# Patient Record
Sex: MALE | Race: WHITE | NOT HISPANIC OR LATINO | Employment: FULL TIME | URBAN - METROPOLITAN AREA
[De-identification: names, ages, dates, MRNs, and addresses within clinical notes are randomized per-mention and may not be internally consistent; named-entity substitution may affect disease eponyms.]

---

## 2017-05-01 RX ORDER — SIMVASTATIN 20 MG
TABLET ORAL
Qty: 90 TAB | Refills: 0 | Status: SHIPPED | OUTPATIENT
Start: 2017-05-01 | End: 2017-08-03 | Stop reason: SDUPTHER

## 2017-05-01 RX ORDER — LISINOPRIL 5 MG/1
TABLET ORAL
Qty: 90 TAB | Refills: 0 | Status: SHIPPED | OUTPATIENT
Start: 2017-05-01 | End: 2017-08-03 | Stop reason: SDUPTHER

## 2017-05-01 NOTE — TELEPHONE ENCOUNTER
LOV 7/16. 3 month supply refilled. Please advise pt to make appt for follow-up and additional fills.

## 2017-05-01 NOTE — TELEPHONE ENCOUNTER
Was the patient seen in the last year in this department? Yes     Does patient have an active prescription for medications requested? No     Received Request Via: Pharmacy      Pt met protocol?: Yes     BP Readings from Last 1 Encounters:   07/26/16 142/84

## 2017-06-20 ENCOUNTER — HOSPITAL ENCOUNTER (OUTPATIENT)
Dept: LAB | Facility: MEDICAL CENTER | Age: 57
End: 2017-06-20
Attending: NURSE PRACTITIONER
Payer: COMMERCIAL

## 2017-06-20 DIAGNOSIS — E11.9 DIABETES MELLITUS TYPE 2, CONTROLLED (HCC): ICD-10-CM

## 2017-06-20 DIAGNOSIS — E11.69 HYPERLIPIDEMIA ASSOCIATED WITH TYPE 2 DIABETES MELLITUS (HCC): ICD-10-CM

## 2017-06-20 DIAGNOSIS — E78.5 HYPERLIPIDEMIA ASSOCIATED WITH TYPE 2 DIABETES MELLITUS (HCC): ICD-10-CM

## 2017-06-20 LAB
25(OH)D3 SERPL-MCNC: 38 NG/ML (ref 30–100)
ALBUMIN SERPL BCP-MCNC: 4.5 G/DL (ref 3.2–4.9)
ALBUMIN/GLOB SERPL: 1.6 G/DL
ALP SERPL-CCNC: 82 U/L (ref 30–99)
ALT SERPL-CCNC: 21 U/L (ref 2–50)
ANION GAP SERPL CALC-SCNC: 8 MMOL/L (ref 0–11.9)
AST SERPL-CCNC: 16 U/L (ref 12–45)
BILIRUB SERPL-MCNC: 0.5 MG/DL (ref 0.1–1.5)
BUN SERPL-MCNC: 14 MG/DL (ref 8–22)
CALCIUM SERPL-MCNC: 9.8 MG/DL (ref 8.5–10.5)
CHLORIDE SERPL-SCNC: 104 MMOL/L (ref 96–112)
CHOLEST SERPL-MCNC: 125 MG/DL (ref 100–199)
CO2 SERPL-SCNC: 27 MMOL/L (ref 20–33)
CREAT SERPL-MCNC: 0.84 MG/DL (ref 0.5–1.4)
CREAT UR-MCNC: 18.6 MG/DL
EST. AVERAGE GLUCOSE BLD GHB EST-MCNC: 177 MG/DL
GFR SERPL CREATININE-BSD FRML MDRD: >60 ML/MIN/1.73 M 2
GLOBULIN SER CALC-MCNC: 2.8 G/DL (ref 1.9–3.5)
GLUCOSE SERPL-MCNC: 188 MG/DL (ref 65–99)
HBA1C MFR BLD: 7.8 % (ref 0–5.6)
HDLC SERPL-MCNC: 36 MG/DL
LDLC SERPL CALC-MCNC: 63 MG/DL
MICROALBUMIN UR-MCNC: <0.7 MG/DL
MICROALBUMIN/CREAT UR: NORMAL MG/G (ref 0–30)
POTASSIUM SERPL-SCNC: 4.7 MMOL/L (ref 3.6–5.5)
PROT SERPL-MCNC: 7.3 G/DL (ref 6–8.2)
SODIUM SERPL-SCNC: 139 MMOL/L (ref 135–145)
TRIGL SERPL-MCNC: 130 MG/DL (ref 0–149)

## 2017-06-20 PROCEDURE — 82043 UR ALBUMIN QUANTITATIVE: CPT

## 2017-06-20 PROCEDURE — 82306 VITAMIN D 25 HYDROXY: CPT

## 2017-06-20 PROCEDURE — 83036 HEMOGLOBIN GLYCOSYLATED A1C: CPT

## 2017-06-20 PROCEDURE — 80061 LIPID PANEL: CPT

## 2017-06-20 PROCEDURE — 82570 ASSAY OF URINE CREATININE: CPT

## 2017-06-20 PROCEDURE — 80053 COMPREHEN METABOLIC PANEL: CPT

## 2017-06-20 PROCEDURE — 36415 COLL VENOUS BLD VENIPUNCTURE: CPT

## 2017-06-23 ENCOUNTER — TELEPHONE (OUTPATIENT)
Dept: MEDICAL GROUP | Facility: PHYSICIAN GROUP | Age: 57
End: 2017-06-23

## 2017-06-23 NOTE — TELEPHONE ENCOUNTER
----- Message from Myra Oliva D.O. sent at 6/23/2017 12:51 PM PDT -----  Elevated fasting sugars and Glycohemoglobin A1C is 7.8% which is not quite at goal for diabetes. Slightly low HDL or good cholesterol. Rest of labs are within normal limits. Advise keeping appointment with Anita next week to further discuss.   (Myra Oliva,  covering for CHRISTIANNE MccannPIleanaRGIBRAN.)

## 2017-06-28 ENCOUNTER — OFFICE VISIT (OUTPATIENT)
Dept: MEDICAL GROUP | Facility: PHYSICIAN GROUP | Age: 57
End: 2017-06-28
Payer: COMMERCIAL

## 2017-06-28 VITALS
BODY MASS INDEX: 30.34 KG/M2 | HEART RATE: 72 BPM | RESPIRATION RATE: 16 BRPM | HEIGHT: 72 IN | WEIGHT: 224 LBS | SYSTOLIC BLOOD PRESSURE: 132 MMHG | TEMPERATURE: 98.5 F | DIASTOLIC BLOOD PRESSURE: 72 MMHG | OXYGEN SATURATION: 95 %

## 2017-06-28 DIAGNOSIS — E78.5 HYPERLIPIDEMIA ASSOCIATED WITH TYPE 2 DIABETES MELLITUS (HCC): ICD-10-CM

## 2017-06-28 DIAGNOSIS — Z12.11 SCREEN FOR COLON CANCER: ICD-10-CM

## 2017-06-28 DIAGNOSIS — F17.219 CIGARETTE NICOTINE DEPENDENCE WITH NICOTINE-INDUCED DISORDER: ICD-10-CM

## 2017-06-28 DIAGNOSIS — E11.69 HYPERLIPIDEMIA ASSOCIATED WITH TYPE 2 DIABETES MELLITUS (HCC): ICD-10-CM

## 2017-06-28 DIAGNOSIS — I15.2 HYPERTENSION DUE TO ENDOCRINE DISORDER: ICD-10-CM

## 2017-06-28 PROCEDURE — 99215 OFFICE O/P EST HI 40 MIN: CPT | Performed by: NURSE PRACTITIONER

## 2017-06-28 RX ORDER — METFORMIN HYDROCHLORIDE 500 MG/1
1000 TABLET, EXTENDED RELEASE ORAL 2 TIMES DAILY WITH MEALS
Qty: 120 TAB | Refills: 2 | Status: SHIPPED | OUTPATIENT
Start: 2017-06-28 | End: 2017-09-16 | Stop reason: SDUPTHER

## 2017-06-28 RX ORDER — VARENICLINE TARTRATE 1 MG/1
1 TABLET, FILM COATED ORAL 2 TIMES DAILY
Qty: 60 TAB | Refills: 2 | Status: SHIPPED | OUTPATIENT
Start: 2017-06-28 | End: 2018-05-30 | Stop reason: SDUPTHER

## 2017-06-28 ASSESSMENT — PATIENT HEALTH QUESTIONNAIRE - PHQ9: CLINICAL INTERPRETATION OF PHQ2 SCORE: 0

## 2017-06-28 NOTE — ASSESSMENT & PLAN NOTE
Diagnosed January 2015, lab results only slightly elevated. No lab results, records requested several months ago but not received.  Managed with metformin 500 mg daily with meal, unfortunately he has not had regular evaluation or been testing his glucose in the last several months. Review of labs indicates his diabetes is not well controlled with A1c of 7.8. Reports he is feeling well. Denies any chest pain, vision changes, urinary symptoms.

## 2017-06-28 NOTE — ASSESSMENT & PLAN NOTE
Managed with simvastatin 20 mg daily. Has been  taking it with morning medication and this has helped with adherence. Denies myalgias

## 2017-06-28 NOTE — ASSESSMENT & PLAN NOTE
Started smoking again Nov 2016 after not smoking for 18 months. Used Chantix with great results, no adverse effects. He was concerned about side effects and did take reduced dose.

## 2017-06-28 NOTE — ASSESSMENT & PLAN NOTE
Managed with lisinopril 5 mg daily.  He has a monitoring device at home but doesn't use it.  No symptoms low BP: light-headed, tunnel-vision, unusual fatigue.   No symptoms high BP: pounding headache, visual changes, palpitations, flushed face. No medicine adverse effects: unusual fatigue, slow heartbeat, cough.

## 2017-06-28 NOTE — PROGRESS NOTES
Subjective:     Chief Complaint   Patient presents with   • Diabetes     follow up    • Chronic Care Management        Quirino Barker is a 56 y.o. male here today for evaluation and management of:    Last OV 7.26.16    Lab Results   Component Value Date/Time    GLYCOHEMOGLOBIN 7.8* 06/20/2017 08:19 AM      Patients most recent cholesterol was reviewed:  Lab Results   Component Value Date/Time    CHOLESTEROL, 06/20/2017 08:19 AM     Lab Results   Component Value Date/Time    LDL 63 06/20/2017 08:19 AM     Lab Results   Component Value Date/Time    HDL 36* 06/20/2017 08:19 AM     Lab Results   Component Value Date/Time    TRIGLYCERIDES 130 06/20/2017 08:19 AM      Lab Results   Component Value Date/Time    SODIUM 139 06/20/2017 08:19 AM    POTASSIUM 4.7 06/20/2017 08:19 AM    CHLORIDE 104 06/20/2017 08:19 AM    CO2 27 06/20/2017 08:19 AM    GLUCOSE 188* 06/20/2017 08:19 AM    BUN 14 06/20/2017 08:19 AM    CREATININE 0.84 06/20/2017 08:19 AM         Cigarette nicotine dependence with nicotine-induced disorder  Started smoking again Nov 2016 after not smoking for 18 months. Used Chantix with great results, no adverse effects. He was concerned about side effects and did take reduced dose.       Uncontrolled type 2 diabetes mellitus without complication, without long-term current use of insulin (CMS-[facility])  Diagnosed January 2015, lab results only slightly elevated. No lab results, records requested several months ago but not received.  Managed with metformin 500 mg daily with meal, unfortunately he has not had regular evaluation or been testing his glucose in the last several months. Review of labs indicates his diabetes is not well controlled with A1c of 7.8. Reports he is feeling well. Denies any chest pain, vision changes, urinary symptoms.      Hypertension due to endocrine disorder  Managed with lisinopril 5 mg daily.  He has a monitoring device at home but doesn't use it.  No symptoms low BP: light-headed,  tunnel-vision, unusual fatigue.   No symptoms high BP: pounding headache, visual changes, palpitations, flushed face. No medicine adverse effects: unusual fatigue, slow heartbeat, cough.     Hyperlipidemia associated with type 2 diabetes mellitus (CMS-HCC)  Managed with simvastatin 20 mg daily. Has been  taking it with morning medication and this has helped with adherence. Denies myalgias      BMI 30.0-30.9,adult  Weight is still higher than after weight loss a few years ago (best around 205 for him) but not significantly changed from last year         ROS   Denies HA, chest pain, shortness of breath, abdominal pain, bladder or bowel changes, lower extremity edema.    Current medicines (including changes today)  Current Outpatient Prescriptions   Medication Sig Dispense Refill   • metformin ER (GLUCOPHAGE XR) 500 MG TABLET SR 24 HR Take 2 Tabs by mouth 2 times a day, with meals. 120 Tab 2   • varenicline (CHANTIX ETTA) 0.5 MG X 11 & 1 MG X 42 tablet 0.5 mg PO Qday for 3 days, then 0.5 mg PO bid for 4 days, then 1 mg PO BID for 11 weeks. 1 Tab 0   • varenicline (CHANTIX) 1 MG tablet Take 1 Tab by mouth 2 times a day. 60 Tab 2   • lisinopril (PRINIVIL) 5 MG Tab TAKE 1 TABLET BY MOUTH ONCE DAILY 90 Tab 0   • simvastatin (ZOCOR) 20 MG Tab TAKE 1 TABLET BY MOUTH EVERY EVENING. 90 Tab 0   • Multiple Vitamins-Minerals (CENTRUM SILVER ADULT 50+) Tab Take  by mouth.       No current facility-administered medications for this visit.       He  has a past medical history of Hypertension; Hyperlipidemia; and Diabetes (CMS-HCC).    Allergies Review of patient's allergies indicates no known allergies.    Current medications, problem list, allergies, past medical history, and tobacco use history reviewed in EPIC today.    Health maintenance reviewed and updated. Educated on the importance of health maintenance including cancer screenings and immunizations. Patient declines colonoscopy at this time.    Objective:   Blood pressure  "132/72, pulse 72, temperature 36.9 °C (98.5 °F), resp. rate 16, height 1.829 m (6' 0.01\"), weight 101.606 kg (224 lb), SpO2 95 %. Body mass index is 30.37 kg/(m^2).     Physical Exam   Constitutional: Alert, no acute distress. Pleasant and cooperative with the examination.  Skin:   Warm, dry, no rashes in visible areas.    Eyes:   Pupils equal, round. Conjunctiva and sclera clear,    Lids normal.  ENT:  Pinna normal.   Neck:   Supple, trachea midline.  Lungs:  Normal effort and respirations. Clear to auscultation bilaterally.  CV:  Regular rate and rhythm.  MS/Ext:  Steady gait, no edema.  Psych:  Eye contact is good, affect calm.    Assessment and Plan:   The following treatment plan was discussed    1. Uncontrolled type 2 diabetes mellitus without complication, without long-term current use of insulin (CMS-[facility])  Uncontrolled, increase metformin to 1000 mg bid and resume glucose testing. counseled regarding lifestyle modifications, treatment, and need for regular evaluation  - metformin ER (GLUCOPHAGE XR) 500 MG TABLET SR 24 HR; Take 2 Tabs by mouth 2 times a day, with meals.  Dispense: 120 Tab; Refill: 2  - HEMOGLOBIN A1C; Future  - COMP METABOLIC PANEL; Future    2. Hypertension due to endocrine disorder  Stable. Continue current medicines. Monitor labs regularly.        3. Hyperlipidemia associated with type 2 diabetes mellitus (CMS-[facility])  Not at goal, HDL low. Continue current medications and lifestyle modifications.    4. Cigarette nicotine dependence with nicotine-induced disorder  Counseled regarding cessation.  - varenicline (CHANTIX ETTA) 0.5 MG X 11 & 1 MG X 42 tablet; 0.5 mg PO Qday for 3 days, then 0.5 mg PO bid for 4 days, then 1 mg PO BID for 11 weeks.  Dispense: 1 Tab; Refill: 0  - varenicline (CHANTIX) 1 MG tablet; Take 1 Tab by mouth 2 times a day.  Dispense: 60 Tab; Refill: 2      5. BMI 30.0-30.9,adult    - OBESITY COUNSELING (No Charge): Patient identified as having weight management issue.  " Appropriate orders and counseling given.    6. Screen for colon cancer    - OCCULT BLOOD FECES IMMUNOASSAY; Future     Followup: Return in about 3 months (around 9/28/2017) for DM.  As scheduled, sooner if symptoms don't resolve or with any new problems.       Patient was seen for 40 minutes, more than 50% of time spent in face to face review, consultation, counseling, and arranging future evaluation and follow up of medical conditions and care.     Reviewed side effects, risks, and benefits of medications prescribed today.  Advised to take all medications as instructed and report any side effects.   The patient voices understanding and agrees.  Report any new or worsening symptoms.  Have labs or other diagnostic studies prior to follow up.  Keep all appointments for any referrals given.      Please note this dictation was created using voice recognition software. Every reasonable attempt has been made to correct obvious errors, however there may be errors of grammar and possibly content that were not discovered before finalizing the note.

## 2017-06-28 NOTE — MR AVS SNAPSHOT
"        Quirino Barker   2017 8:15 AM   Office Visit   MRN: [id number]    Department:  [facility]   Dept Phone:  [phone]    Description:  Male : 1960   Provider:  JOYCE Mccann           Reason for Visit     Diabetes follow up     Chronic Care Management           Allergies as of 2017     No Known Allergies      You were diagnosed with     Uncontrolled type 2 diabetes mellitus without complication, without long-term current use of insulin (CMS-HCC)   [0516652]       Hypertension due to endocrine disorder   [8977256]       Hyperlipidemia associated with type 2 diabetes mellitus (CMS-[facility])   [7129960]       Cigarette nicotine dependence with nicotine-induced disorder   [389359]       Screen for colon cancer   [114612]         Vital Signs     Blood Pressure Pulse Temperature Respirations Height Weight    132/72 mmHg 72 36.9 °C (98.5 °F) 16 1.829 m (6' 0.01\") 101.606 kg (224 lb)    Body Mass Index Oxygen Saturation Smoking Status             30.37 kg/m2 95% Current Every Day Smoker         Basic Information     Date Of Birth Sex Race Ethnicity Preferred Language    1960 Male White Non- English      Your appointments     Oct 11, 2017  7:55 AM   Established Patient with JOYCE Mccann   [facility] ([place])    [address] [place] [address] [address]  [facility] [address] [phone]           You will be receiving a confirmation call a few days before your appointment from our automated call confirmation system.              Problem List              ICD-10-CM Priority Class Noted - Resolved    Uncontrolled type 2 diabetes mellitus without complication, without long-term current use of insulin (CMS-HCC) E11.65   2016 - Present    Hypertension due to endocrine disorder I15.2   2016 - Present    Hyperlipidemia associated with type 2 diabetes mellitus (CMS-[facility]) E11.69, E78.5   2016 - Present    Cigarette nicotine dependence with " nicotine-induced disorder F17.219   4/25/2016 - Present    BMI 30.0-30.9,adult Z68.30   7/26/2016 - Present      Health Maintenance        Date Due Completion Dates    COLON CANCER SCREENING ANNUAL FIT 1960 ---    IMM HEP B VACCINE (1 of 3 - Primary Series) 1960 ---    DIABETES MONOFILAMENT / LE EXAM 2/10/1963 ---    RETINAL SCREENING 8/10/1980 ---    IMM DTaP/Tdap/Td Vaccine (1 - Tdap) 8/10/1981 ---    IMM PNEUMOCOCCAL 19-64 (ADULT) MEDIUM RISK SERIES (1 of 1 - PPSV23) 8/10/1981 ---    A1C SCREENING 12/20/2017 6/20/2017    FASTING LIPID PROFILE 6/20/2018 6/20/2017    URINE ACR / MICROALBUMIN 6/20/2018 6/20/2017    SERUM CREATININE 6/20/2018 6/20/2017            Current Immunizations     No immunizations on file.      Below and/or attached are the medications your provider expects you to take. Review all of your home medications and newly ordered medications with your provider and/or pharmacist. Follow medication instructions as directed by your provider and/or pharmacist. Please keep your medication list with you and share with your provider. Update the information when medications are discontinued, doses are changed, or new medications (including over-the-counter products) are added; and carry medication information at all times in the event of emergency situations     Allergies:  No Known Allergies          Medications  Valid as of: June 28, 2017 -  9:04 AM    Generic Name Brand Name Tablet Size Instructions for use    Lisinopril (Tab) PRINIVIL 5 MG TAKE 1 TABLET BY MOUTH ONCE DAILY        MetFORMIN HCl (TABLET SR 24 HR) GLUCOPHAGE  MG Take 2 Tabs by mouth 2 times a day, with meals.        Multiple Vitamins-Minerals (Tab) CENTRUM SILVER ADULT 50+  Take  by mouth.        Simvastatin (Tab) ZOCOR 20 MG TAKE 1 TABLET BY MOUTH EVERY EVENING.        Varenicline Tartrate (Misc) CHANTIX ETTA 0.5 MG X 11 & 1 MG X 42 0.5 mg PO Qday for 3 days, then 0.5 mg PO bid for 4 days, then 1 mg PO BID for 11 weeks.         Varenicline Tartrate (Tab) CHANTIX 1 MG Take 1 Tab by mouth 2 times a day.        .                 Medicines prescribed today were sent to:     [id number] [place] [facility] [place] - [address]    [address] [place] [address]    Phone: [phone] Fax: [phone]    Open 24 Hours?: No      Medication refill instructions:       If your prescription bottle indicates you have medication refills left, it is not necessary to call your provider’s office. Please contact your pharmacy and they will refill your medication.    If your prescription bottle indicates you do not have any refills left, you may request refills at any time through one of the following ways: The online Mysafeplace system (except Urgent Care), by calling your provider’s office, or by asking your pharmacy to contact your provider’s office with a refill request. Medication refills are processed only during regular business hours and may not be available until the next business day. Your provider may request additional information or to have a follow-up visit with you prior to refilling your medication.   *Please Note: Medication refills are assigned a new Rx number when refilled electronically. Your pharmacy may indicate that no refills were authorized even though a new prescription for the same medication is available at the pharmacy. Please request the medicine by name with the pharmacy before contacting your provider for a refill.        Your To Do List     Future Labs/Procedures Complete By Expires    COMP METABOLIC PANEL  As directed 6/29/2018    HEMOGLOBIN A1C  As directed 6/29/2018    OCCULT BLOOD FECES IMMUNOASSAY  As directed 6/29/2018         Mysafeplace Access Code: [email or web address]  Expires: 7/20/2017  8:12 AM    Mysafeplace  A secure, online tool to manage your health information     [employer]’s Mysafeplace® is a secure, online tool that connects you to your personalized health information from the privacy of your home -- day or night  - making it very easy for you to manage your healthcare. Once the activation process is completed, you can even access your medical information using the [employer] francia, which is available for free in the Apple Francia store or Google Play store.     [employer] provides the following levels of access (as shown below):   My Chart Features   [facility] Doctor [facility]  Specialists [facility]  Urgent  Care Non-[facility]  Primary Care  Doctor   Email your healthcare team securely and privately 24/7 X X X    Manage appointments: schedule your next appointment; view details of past/upcoming appointments X      Request prescription refills. X      View recent personal medical records, including lab and immunizations X X X X   View health record, including health history, allergies, medications X X X X   Read reports about your outpatient visits, procedures, consult and ER notes X X X X   See your discharge summary, which is a recap of your hospital and/or ER visit that includes your diagnosis, lab results, and care plan. X X       How to register for [employer]:  1. Go to  [email or web address].  2. Click on the Sign Up Now box, which takes you to the New Member Sign Up page. You will need to provide the following information:  a. Enter your [employer] Access Code exactly as it appears at the top of this page. (You will not need to use this code after you’ve completed the sign-up process. If you do not sign up before the expiration date, you must request a new code.)   b. Enter your date of birth.   c. Enter your home email address.   d. Click Submit, and follow the next screen’s instructions.  3. Create a [employer] ID. This will be your [employer] login ID and cannot be changed, so think of one that is secure and easy to remember.  4. Create a [employer] password. You can change your password at any time.  5. Enter your Password Reset Question and Answer. This can be used at a later time if you forget your password.   6. Enter your e-mail  address. This allows you to receive e-mail notifications when new information is available in [employer].  7. Click Sign Up. You can now view your health information.    For assistance activating your [employer] account, call [phone]        Quit Tobacco Information     Do you want to quit using tobacco?    Quitting tobacco decreases risks of cancer, heart and lung disease, increases life expectancy, improves sense of taste and smell, and increases spending money, among other benefits.    If you are thinking about quitting, we can help.  • [facility] Quit Tobacco Program: 259.114.5214  o Program occurs weekly for four weeks and includes pharmacist consultation on products to support quitting smoking or chewing tobacco. A provider referral is needed for pharmacist consultation.  • Tobacco Users Help Hotline: 3-800-QUIT-NOW (871-1618) or https://[place].quitlogix.org/  o Free, confidential telephone and online coaching for [place] residents. Sessions are designed on a schedule that is convenient for you. Eligible clients receive free nicotine replacement therapy.  • Nationally: www.smokefree.gov  o Information and professional assistance to support both immediate and long-term needs as you become, and remain, a non-smoker. Smokefree.gov allows you to choose the help that best fits your needs.

## 2017-06-28 NOTE — ASSESSMENT & PLAN NOTE
Weight is still higher than after weight loss a few years ago (best around 205 for him) but not significantly changed from last year

## 2017-08-04 RX ORDER — LISINOPRIL 5 MG/1
TABLET ORAL
Qty: 90 TAB | Refills: 1 | Status: SHIPPED | OUTPATIENT
Start: 2017-08-04 | End: 2018-01-14 | Stop reason: SDUPTHER

## 2017-08-04 RX ORDER — SIMVASTATIN 20 MG
TABLET ORAL
Qty: 90 TAB | Refills: 1 | Status: SHIPPED | OUTPATIENT
Start: 2017-08-04 | End: 2018-01-14 | Stop reason: SDUPTHER

## 2017-08-04 NOTE — TELEPHONE ENCOUNTER
Pt has had OV within the 12 month protocol and lipid panel is current. 6 month supply sent to pharmacy.   Lab Results   Component Value Date/Time    CHOLESTEROL, 06/20/2017 08:19 AM    LDL 63 06/20/2017 08:19 AM    HDL 36* 06/20/2017 08:19 AM    TRIGLYCERIDES 130 06/20/2017 08:19 AM       Lab Results   Component Value Date/Time    SODIUM 139 06/20/2017 08:19 AM    POTASSIUM 4.7 06/20/2017 08:19 AM    CHLORIDE 104 06/20/2017 08:19 AM    CO2 27 06/20/2017 08:19 AM    GLUCOSE 188* 06/20/2017 08:19 AM    BUN 14 06/20/2017 08:19 AM    CREATININE 0.84 06/20/2017 08:19 AM     Lab Results   Component Value Date/Time    ALKALINE PHOSPHATASE 82 06/20/2017 08:19 AM    AST(SGOT) 16 06/20/2017 08:19 AM    ALT(SGPT) 21 06/20/2017 08:19 AM    TOTAL BILIRUBIN 0.5 06/20/2017 08:19 AM

## 2017-08-04 NOTE — TELEPHONE ENCOUNTER
Was the patient seen in the last year in this department? Yes     Does patient have an active prescription for medications requested? No     Received Request Via: Pharmacy      Pt met protocol?: Yes    LAST OV 06/28/17    BP Readings from Last 1 Encounters:   06/28/17 132/72     Lab Results   Component Value Date/Time    GLYCOHEMOGLOBIN 7.8* 06/20/2017 08:19 AM        Lab Results   Component Value Date/Time    HDL 36* 06/20/2017 08:19 AM

## 2017-09-18 RX ORDER — METFORMIN HYDROCHLORIDE 500 MG/1
TABLET, EXTENDED RELEASE ORAL
Qty: 360 TAB | Refills: 1 | Status: SHIPPED | OUTPATIENT
Start: 2017-09-18 | End: 2018-03-15 | Stop reason: SDUPTHER

## 2017-09-18 NOTE — TELEPHONE ENCOUNTER
Was the patient seen in the last year in this department? Yes     Does patient have an active prescription for medications requested? No     Received Request Via: Pharmacy      Pt met protocol?: Yes pt last ov 6/17   Lab Results   Component Value Date/Time    HDL 36 (A) 06/20/2017 08:19 AM      Cholesterol,Tot   Date Value Ref Range Status   06/20/2017 125 100 - 199 mg/dL Final

## 2017-09-18 NOTE — TELEPHONE ENCOUNTER
Patient has recently been seen by PCP within the last 6 months per protocol (6-28-17). Will refill medications for 6 months.  Lab Results   Component Value Date/Time    HBA1C 7.8 (H) 06/20/2017 08:19 AM      Lab Results   Component Value Date/Time    MALBCRT see below 06/20/2017 08:19 AM    MICROALBUR <0.7 06/20/2017 08:19 AM      Lab Results   Component Value Date/Time    ALKPHOSPHAT 82 06/20/2017 08:19 AM    ASTSGOT 16 06/20/2017 08:19 AM    ALTSGPT 21 06/20/2017 08:19 AM    TBILIRUBIN 0.5 06/20/2017 08:19 AM        Rao Acosta, [place]

## 2017-10-24 ENCOUNTER — HOSPITAL ENCOUNTER (OUTPATIENT)
Facility: MEDICAL CENTER | Age: 57
End: 2017-10-24
Attending: NURSE PRACTITIONER
Payer: COMMERCIAL

## 2017-10-24 PROCEDURE — 82274 ASSAY TEST FOR BLOOD FECAL: CPT

## 2017-10-25 ENCOUNTER — HOSPITAL ENCOUNTER (OUTPATIENT)
Dept: LAB | Facility: MEDICAL CENTER | Age: 57
End: 2017-10-25
Attending: NURSE PRACTITIONER
Payer: COMMERCIAL

## 2017-10-25 LAB
ALBUMIN SERPL BCP-MCNC: 4.5 G/DL (ref 3.2–4.9)
ALBUMIN/GLOB SERPL: 1.7 G/DL
ALP SERPL-CCNC: 81 U/L (ref 30–99)
ALT SERPL-CCNC: 19 U/L (ref 2–50)
ANION GAP SERPL CALC-SCNC: 7 MMOL/L (ref 0–11.9)
AST SERPL-CCNC: 18 U/L (ref 12–45)
BILIRUB SERPL-MCNC: 0.4 MG/DL (ref 0.1–1.5)
BUN SERPL-MCNC: 17 MG/DL (ref 8–22)
CALCIUM SERPL-MCNC: 9.9 MG/DL (ref 8.5–10.5)
CHLORIDE SERPL-SCNC: 105 MMOL/L (ref 96–112)
CO2 SERPL-SCNC: 27 MMOL/L (ref 20–33)
CREAT SERPL-MCNC: 0.86 MG/DL (ref 0.5–1.4)
EST. AVERAGE GLUCOSE BLD GHB EST-MCNC: 148 MG/DL
GFR SERPL CREATININE-BSD FRML MDRD: >60 ML/MIN/1.73 M 2
GLOBULIN SER CALC-MCNC: 2.6 G/DL (ref 1.9–3.5)
GLUCOSE SERPL-MCNC: 151 MG/DL (ref 65–99)
HBA1C MFR BLD: 6.8 % (ref 0–5.6)
POTASSIUM SERPL-SCNC: 4.8 MMOL/L (ref 3.6–5.5)
PROT SERPL-MCNC: 7.1 G/DL (ref 6–8.2)
SODIUM SERPL-SCNC: 139 MMOL/L (ref 135–145)

## 2017-10-25 PROCEDURE — 36415 COLL VENOUS BLD VENIPUNCTURE: CPT

## 2017-10-25 PROCEDURE — 80053 COMPREHEN METABOLIC PANEL: CPT

## 2017-10-25 PROCEDURE — 83036 HEMOGLOBIN GLYCOSYLATED A1C: CPT

## 2017-10-26 DIAGNOSIS — Z12.11 SCREEN FOR COLON CANCER: ICD-10-CM

## 2017-10-26 LAB — HEMOCCULT STL QL IA: POSITIVE

## 2017-11-16 ENCOUNTER — PATIENT MESSAGE (OUTPATIENT)
Dept: MEDICAL GROUP | Facility: PHYSICIAN GROUP | Age: 57
End: 2017-11-16

## 2017-11-16 DIAGNOSIS — R19.5 POSITIVE FIT (FECAL IMMUNOCHEMICAL TEST): ICD-10-CM

## 2017-11-16 NOTE — TELEPHONE ENCOUNTER
----- Message from Chichi Tovar D.O. sent at 11/16/2017  9:42 AM PST -----  Please call pt to inform them that the results from recent stool test shows blood. I recommend follow up with GI for colonoscopy. Referral created.     -Dr. Hill

## 2017-12-07 ENCOUNTER — OFFICE VISIT (OUTPATIENT)
Dept: MEDICAL GROUP | Facility: PHYSICIAN GROUP | Age: 57
End: 2017-12-07
Payer: COMMERCIAL

## 2017-12-07 VITALS
HEIGHT: 72 IN | TEMPERATURE: 98.9 F | RESPIRATION RATE: 16 BRPM | WEIGHT: 217.5 LBS | DIASTOLIC BLOOD PRESSURE: 82 MMHG | BODY MASS INDEX: 29.46 KG/M2 | HEART RATE: 83 BPM | SYSTOLIC BLOOD PRESSURE: 126 MMHG | OXYGEN SATURATION: 95 %

## 2017-12-07 DIAGNOSIS — F17.219 CIGARETTE NICOTINE DEPENDENCE WITH NICOTINE-INDUCED DISORDER: ICD-10-CM

## 2017-12-07 DIAGNOSIS — E78.5 HYPERLIPIDEMIA ASSOCIATED WITH TYPE 2 DIABETES MELLITUS (HCC): ICD-10-CM

## 2017-12-07 DIAGNOSIS — E11.9 CONTROLLED TYPE 2 DIABETES MELLITUS WITHOUT COMPLICATION, WITHOUT LONG-TERM CURRENT USE OF INSULIN (HCC): ICD-10-CM

## 2017-12-07 DIAGNOSIS — R19.5 POSITIVE FIT (FECAL IMMUNOCHEMICAL TEST): ICD-10-CM

## 2017-12-07 DIAGNOSIS — E11.69 HYPERLIPIDEMIA ASSOCIATED WITH TYPE 2 DIABETES MELLITUS (HCC): ICD-10-CM

## 2017-12-07 DIAGNOSIS — I15.2 HYPERTENSION DUE TO ENDOCRINE DISORDER: ICD-10-CM

## 2017-12-07 PROCEDURE — 99214 OFFICE O/P EST MOD 30 MIN: CPT | Performed by: NURSE PRACTITIONER

## 2017-12-07 ASSESSMENT — PAIN SCALES - GENERAL: PAINLEVEL: NO PAIN

## 2017-12-07 NOTE — ASSESSMENT & PLAN NOTE
Currently smoking approximately one half pack per day. Plans to quit smoking after the first of the year and is hopeful his insurance will cover Chantix.

## 2017-12-07 NOTE — PROGRESS NOTES
Subjective:     Chief Complaint   Patient presents with   • Follow-Up     6 mos FV     Quirino Barker is a 57 y.o. male here today for evaluation and management of issues listed below.    Controlled type 2 diabetes mellitus without complication, without long-term current use of insulin (CMS-HCC)  Diagnosed January 2015, lab results only slightly elevated. No lab results, records requested several months ago but not received.  Managed with metformin xr 1000 mg bid with meal,  Review of labs indicates his diabetes was not well controlled with A1c of 7.8 but improved to 6.8 in October 2017. Continues to have loose stool, weekly diarrhea.     Hypertension due to endocrine disorder  Managed with lisinopril 5 mg daily.  No symptoms low BP: light-headed, tunnel-vision, unusual fatigue.   No symptoms high BP: pounding headache, visual changes, palpitations, flushed face. No medicine adverse effects: unusual fatigue, slow heartbeat, cough.     Hyperlipidemia associated with type 2 diabetes mellitus (CMS-HCC)  Managed with simvastatin 20 mg daily. Has been  taking it with morning medication and this has helped with adherence. Denies myalgias    Cigarette nicotine dependence with nicotine-induced disorder  Currently smoking approximately one half pack per day. Plans to quit smoking after the first of the year and is hopeful his insurance will cover Chantix.     1. Controlled type 2 diabetes mellitus without complication, without long-term current use of insulin (CMS-HCC)    2. Hypertension due to endocrine disorder    3. Hyperlipidemia associated with type 2 diabetes mellitus (CMS-HCC)    4. Cigarette nicotine dependence with nicotine-induced disorder    5. Positive FIT (fecal immunochemical test)        ROS   Denies HA, chest pain, shortness of breath, abdominal pain, bladder or bowel changes, lower extremity edema. All other pertinent systems reviewed and are negative except as in HPI.    Allergies: Patient has no known  allergies.  Current medicines (including changes today)  Current Outpatient Prescriptions   Medication Sig Dispense Refill   • metformin ER (GLUCOPHAGE XR) 500 MG TABLET SR 24 HR TAKE 2 TABLETS BY MOUTH 2 TIMES A DAY, WITH MEALS. 360 Tab 1   • simvastatin (ZOCOR) 20 MG Tab TAKE 1 TABLET BY MOUTH EVERY EVENING. 90 Tab 1   • lisinopril (PRINIVIL) 5 MG Tab TAKE 1 TABLET BY MOUTH ONCE DAILY 90 Tab 1   • Multiple Vitamins-Minerals (CENTRUM SILVER ADULT 50+) Tab Take  by mouth.     • varenicline (CHANTIX ETTA) 0.5 MG X 11 & 1 MG X 42 tablet 0.5 mg PO Qday for 3 days, then 0.5 mg PO bid for 4 days, then 1 mg PO BID for 11 weeks. 1 Tab 0   • varenicline (CHANTIX) 1 MG tablet Take 1 Tab by mouth 2 times a day. 60 Tab 2     No current facility-administered medications for this visit.        He  has a past medical history of Diabetes (CMS-HCC); Hyperlipidemia; and Hypertension.      Health Maintenance: Reviewed and updated.      Objective:   Blood pressure 126/82, pulse 83, temperature 37.2 °C (98.9 °F), resp. rate 16, height 1.829 m (6'), weight 98.7 kg (217 lb 8 oz), SpO2 95 %. Body mass index is 29.5 kg/m².  Physical Exam   Alert, no acute distress. Pleasant and cooperative with the examination.  Eye contact is good, affect calm.  Skin: Warm, dry, no rashes in visible areas.    Eyes: Pupils equal, round. Conjunctiva and sclera clear, lids normal.  ENT: Pinna normal.  Neck: Supple, trachea midline.  Lungs: Normal effort and respirations. Clear to auscultation bilaterally.   Abdomen: No CVAT   CV: Regular rate and rhythm.  MS/Ext: Steady gait, no edema.    Results reviewed  Lab results 10.2017 compared to previous     Assessment and Plan:   Treatment:   Quirino was seen today for follow-up.    Diagnoses and all orders for this visit:    Controlled type 2 diabetes mellitus without complication, without long-term current use of insulin (CMS-HCC)  Comments:  Improved. He'll continue extended-release metformin 1000 mg twice a day.  We'll continue to monitor. A1c at next office visit in 3 months.    Hypertension due to endocrine disorder  Comments:  Stable. Continue current medications. Will continue to monitor.    Hyperlipidemia associated with type 2 diabetes mellitus (CMS-HCC)  Comments:  Continue current medications. Will continue to monitor.    Cigarette nicotine dependence with nicotine-induced disorder  Comments:  Counseled regarding smoking cessation.    Positive FIT (fecal immunochemical test)  Comments:  Advised continued follow-up with gastroenterology and colonoscopy as scheduled.        Followup: Return in about 3 months (around 3/7/2018) for DM. Sooner should new symptoms or problems arise, or as previously scheduled.           Reviewed side effects, risks, and benefits of medications prescribed today.  Advised to take all medications as instructed and report any side effects.   The patient voices understanding and agrees.  Report any new or worsening symptoms.  Have labs or other diagnostic studies prior to follow up.  Keep all appointments for any referrals given.        Please note this dictation was created using voice recognition software. Every reasonable attempt has been made to correct obvious errors, however there may be errors of grammar and possibly content that were not discovered before finalizing the note.

## 2017-12-07 NOTE — ASSESSMENT & PLAN NOTE
Managed with lisinopril 5 mg daily.  No symptoms low BP: light-headed, tunnel-vision, unusual fatigue.   No symptoms high BP: pounding headache, visual changes, palpitations, flushed face. No medicine adverse effects: unusual fatigue, slow heartbeat, cough.

## 2017-12-07 NOTE — ASSESSMENT & PLAN NOTE
Diagnosed January 2015, lab results only slightly elevated. No lab results, records requested several months ago but not received.  Managed with metformin xr 1000 mg bid with meal,  Review of labs indicates his diabetes was not well controlled with A1c of 7.8 but improved to 6.8 in October 2017. Continues to have loose stool, weekly diarrhea.

## 2018-01-15 RX ORDER — SIMVASTATIN 20 MG
TABLET ORAL
Qty: 90 TAB | Refills: 0 | Status: SHIPPED | OUTPATIENT
Start: 2018-01-15 | End: 2018-04-13 | Stop reason: SDUPTHER

## 2018-01-15 RX ORDER — LISINOPRIL 5 MG/1
TABLET ORAL
Qty: 90 TAB | Refills: 0 | Status: SHIPPED | OUTPATIENT
Start: 2018-01-15 | End: 2018-04-13 | Stop reason: SDUPTHER

## 2018-01-15 NOTE — TELEPHONE ENCOUNTER
Was the patient seen in the last year in this department? Yes     Does patient have an active prescription for medications requested? No     Received Request Via: Pharmacy      Pt met protocol?: Yes    OV 10/17     BP Readings from Last 1 Encounters:   12/07/17 126/82       Lab Results  Component Value Date/Time   CHOLSTRLTOT 125 06/20/2017 0819   TRIGLYCERIDE 130 06/20/2017 0819   HDL 36 (A) 06/20/2017 0819   LDL 63 06/20/2017 0819

## 2018-02-16 ENCOUNTER — TELEPHONE (OUTPATIENT)
Dept: MEDICAL GROUP | Facility: PHYSICIAN GROUP | Age: 58
End: 2018-02-16

## 2018-02-16 DIAGNOSIS — F17.219 CIGARETTE NICOTINE DEPENDENCE WITH NICOTINE-INDUCED DISORDER: ICD-10-CM

## 2018-02-17 NOTE — TELEPHONE ENCOUNTER
Requested Prescriptions     Signed Prescriptions Disp Refills   • varenicline (CHANTIX ETTA) 0.5 MG X 11 & 1 MG X 42 tablet 53 Tab 0     Si.5 mg PO Qday for 3 days, then 0.5 mg PO bid for 4 days, then 1 mg PO BID for 11 weeks.     Authorizing Provider: CHUCKEI RUBIO     RX for refill sent to pharmacy.  MEGAN Mccann.

## 2018-02-26 ENCOUNTER — TELEPHONE (OUTPATIENT)
Dept: MEDICAL GROUP | Facility: PHYSICIAN GROUP | Age: 58
End: 2018-02-26

## 2018-03-15 ENCOUNTER — TELEPHONE (OUTPATIENT)
Dept: MEDICAL GROUP | Facility: PHYSICIAN GROUP | Age: 58
End: 2018-03-15

## 2018-03-15 DIAGNOSIS — E11.9 CONTROLLED TYPE 2 DIABETES MELLITUS WITHOUT COMPLICATION, WITHOUT LONG-TERM CURRENT USE OF INSULIN (HCC): ICD-10-CM

## 2018-03-15 DIAGNOSIS — I15.2 HYPERTENSION DUE TO ENDOCRINE DISORDER: ICD-10-CM

## 2018-03-15 RX ORDER — METFORMIN HYDROCHLORIDE 500 MG/1
TABLET, EXTENDED RELEASE ORAL
Qty: 360 TAB | Refills: 0 | Status: SHIPPED | OUTPATIENT
Start: 2018-03-15 | End: 2018-06-11 | Stop reason: SDUPTHER

## 2018-04-03 ENCOUNTER — HOSPITAL ENCOUNTER (OUTPATIENT)
Dept: LAB | Facility: MEDICAL CENTER | Age: 58
End: 2018-04-03
Attending: PHYSICIAN ASSISTANT
Payer: COMMERCIAL

## 2018-04-03 DIAGNOSIS — I15.2 HYPERTENSION DUE TO ENDOCRINE DISORDER: ICD-10-CM

## 2018-04-03 DIAGNOSIS — E11.9 CONTROLLED TYPE 2 DIABETES MELLITUS WITHOUT COMPLICATION, WITHOUT LONG-TERM CURRENT USE OF INSULIN (HCC): ICD-10-CM

## 2018-04-03 LAB
ALBUMIN SERPL BCP-MCNC: 4.5 G/DL (ref 3.2–4.9)
ALBUMIN/GLOB SERPL: 1.7 G/DL
ALP SERPL-CCNC: 76 U/L (ref 30–99)
ALT SERPL-CCNC: 19 U/L (ref 2–50)
ANION GAP SERPL CALC-SCNC: 6 MMOL/L (ref 0–11.9)
AST SERPL-CCNC: 17 U/L (ref 12–45)
BILIRUB SERPL-MCNC: 0.4 MG/DL (ref 0.1–1.5)
BUN SERPL-MCNC: 18 MG/DL (ref 8–22)
CALCIUM SERPL-MCNC: 10.1 MG/DL (ref 8.5–10.5)
CHLORIDE SERPL-SCNC: 104 MMOL/L (ref 96–112)
CO2 SERPL-SCNC: 28 MMOL/L (ref 20–33)
CREAT SERPL-MCNC: 0.9 MG/DL (ref 0.5–1.4)
EST. AVERAGE GLUCOSE BLD GHB EST-MCNC: 163 MG/DL
GLOBULIN SER CALC-MCNC: 2.6 G/DL (ref 1.9–3.5)
GLUCOSE SERPL-MCNC: 161 MG/DL (ref 65–99)
HBA1C MFR BLD: 7.3 % (ref 0–5.6)
POTASSIUM SERPL-SCNC: 4.8 MMOL/L (ref 3.6–5.5)
PROT SERPL-MCNC: 7.1 G/DL (ref 6–8.2)
SODIUM SERPL-SCNC: 138 MMOL/L (ref 135–145)

## 2018-04-03 PROCEDURE — 83036 HEMOGLOBIN GLYCOSYLATED A1C: CPT

## 2018-04-03 PROCEDURE — 80053 COMPREHEN METABOLIC PANEL: CPT

## 2018-04-03 PROCEDURE — 36415 COLL VENOUS BLD VENIPUNCTURE: CPT

## 2018-04-05 RX ORDER — GLIPIZIDE 5 MG/1
5 TABLET ORAL 2 TIMES DAILY
Qty: 60 TAB | Refills: 2 | Status: SHIPPED | OUTPATIENT
Start: 2018-04-05 | End: 2018-06-27 | Stop reason: SDUPTHER

## 2018-04-06 ENCOUNTER — TELEPHONE (OUTPATIENT)
Dept: MEDICAL GROUP | Facility: PHYSICIAN GROUP | Age: 58
End: 2018-04-06

## 2018-04-06 NOTE — TELEPHONE ENCOUNTER
----- Message from Angelita Tavera M.D. sent at 4/5/2018 11:55 PM PDT -----  Your A1C is worsening . Are you taking your current metformin 1000mg BID regularly ? If so you will need addition of another medication for better control of diabetes sent to your pharmacy.   Angelita Tavera M.D.

## 2018-04-13 RX ORDER — LISINOPRIL 5 MG/1
TABLET ORAL
Qty: 90 TAB | Refills: 1 | Status: SHIPPED | OUTPATIENT
Start: 2018-04-13 | End: 2018-09-11 | Stop reason: SDUPTHER

## 2018-04-13 RX ORDER — SIMVASTATIN 20 MG
TABLET ORAL
Qty: 90 TAB | Refills: 1 | Status: SHIPPED | OUTPATIENT
Start: 2018-04-13 | End: 2018-09-11 | Stop reason: SDUPTHER

## 2018-04-13 NOTE — TELEPHONE ENCOUNTER
Pt has had OV within the 12 month protocol and lipid panel is current. 6 month supply sent to pharmacy.   Lab Results   Component Value Date/Time    CHOLSTRLTOT 125 06/20/2017 08:19 AM    LDL 63 06/20/2017 08:19 AM    HDL 36 (A) 06/20/2017 08:19 AM    TRIGLYCERIDE 130 06/20/2017 08:19 AM       Lab Results   Component Value Date/Time    SODIUM 138 04/03/2018 08:13 AM    POTASSIUM 4.8 04/03/2018 08:13 AM    CHLORIDE 104 04/03/2018 08:13 AM    CO2 28 04/03/2018 08:13 AM    GLUCOSE 161 (H) 04/03/2018 08:13 AM    BUN 18 04/03/2018 08:13 AM    CREATININE 0.90 04/03/2018 08:13 AM     Lab Results   Component Value Date/Time    ALKPHOSPHAT 76 04/03/2018 08:13 AM    ASTSGOT 17 04/03/2018 08:13 AM    ALTSGPT 19 04/03/2018 08:13 AM    TBILIRUBIN 0.4 04/03/2018 08:13 AM

## 2018-04-13 NOTE — TELEPHONE ENCOUNTER
Was the patient seen in the last year in this department? Yes     Does patient have an active prescription for medications requested? No     Received Request Via: Pharmacy    Pt met protocol?: Yes     Last OV 12/2017  BP Readings from Last 1 Encounters:   12/07/17 126/82     Lab Results  Component Value Date/Time   CHOLSTRLTOT 125 06/20/2017 0819   TRIGLYCERIDE 130 06/20/2017 0819   HDL 36 (A) 06/20/2017 0819   LDL 63 06/20/2017 0819

## 2018-05-30 DIAGNOSIS — F17.219 CIGARETTE NICOTINE DEPENDENCE WITH NICOTINE-INDUCED DISORDER: ICD-10-CM

## 2018-05-31 RX ORDER — VARENICLINE TARTRATE 1 MG/1
1 TABLET, FILM COATED ORAL 2 TIMES DAILY
Qty: 60 TAB | Refills: 0 | Status: SHIPPED | OUTPATIENT
Start: 2018-05-31 | End: 2018-07-26

## 2018-05-31 NOTE — TELEPHONE ENCOUNTER
*PT NEEDS TO ESTABLISH WITH NEW PCP*  Was the patient seen in the last year in this department? Yes     Does patient have an active prescription for medications requested? No     Received Request Via: Pharmacy      Pt met protocol?: YES    LAST OV 12/07/2017

## 2018-05-31 NOTE — TELEPHONE ENCOUNTER
Last seen by PCP 12/17. Chantix Rx written 2/18. Will send 1 month to pharmacy. Patient is due for an appointment with new provider. Please schedule.

## 2018-06-11 RX ORDER — METFORMIN HYDROCHLORIDE 500 MG/1
TABLET, EXTENDED RELEASE ORAL
Qty: 360 TAB | Refills: 0 | Status: SHIPPED | OUTPATIENT
Start: 2018-06-11 | End: 2018-09-07 | Stop reason: SDUPTHER

## 2018-06-11 NOTE — TELEPHONE ENCOUNTER
*Note on last rx for patient to make appointment*  Was the patient seen in the last year in this department? Yes     Does patient have an active prescription for medications requested? No     Received Request Via: Pharmacy    Pt met protocol?: Yes     Last OV 12/2017  Lab Results   Component Value Date/Time    HBA1C 7.3 (H) 04/03/2018 08:13 AM     Lab Results  Component Value Date/Time   CHOLSTRLTOT 125 06/20/2017 0819   TRIGLYCERIDE 130 06/20/2017 0819   HDL 36 (A) 06/20/2017 0819   LDL 63 06/20/2017 0819

## 2018-06-28 RX ORDER — GLIPIZIDE 5 MG/1
5 TABLET ORAL 2 TIMES DAILY
Qty: 60 TAB | Refills: 2 | Status: SHIPPED | OUTPATIENT
Start: 2018-06-28 | End: 2018-09-11 | Stop reason: SDUPTHER

## 2018-06-28 NOTE — TELEPHONE ENCOUNTER
Last seen by PCP 12/17. Med added 4/18. Will send 3 months to pharmacy. Patient is due for an appointment. Please schedule.

## 2018-06-28 NOTE — TELEPHONE ENCOUNTER
*PT NEEDS TO ESTABLISH WITH NEW PCP AND HAVE LABS UPDATED*  Was the patient seen in the last year in this department? Yes     Does patient have an active prescription for medications requested? No     Received Request Via: Pharmacy      Pt met protocol?: Yes/NO  LAST OV 12/07/2017      Lab Results  Component Value Date/Time   HBA1C 7.3 (H) 04/03/2018 0813       Lab Results  Component Value Date/Time   AVGLUC 163 04/03/2018 0813       Lab Results  Component Value Date/Time   CHOLSTRLTOT 125 06/20/2017 0819       Lab Results  Component Value Date/Time   TRIGLYCERIDE 130 06/20/2017 0819       Lab Results  Component Value Date/Time   HDL 36 (A) 06/20/2017 0819       Lab Results  Component Value Date/Time   LDL 63 06/20/2017 0819

## 2018-07-26 ENCOUNTER — OFFICE VISIT (OUTPATIENT)
Dept: MEDICAL GROUP | Facility: CLINIC | Age: 58
End: 2018-07-26
Payer: COMMERCIAL

## 2018-07-26 VITALS
RESPIRATION RATE: 18 BRPM | TEMPERATURE: 98.2 F | HEART RATE: 62 BPM | HEIGHT: 72 IN | DIASTOLIC BLOOD PRESSURE: 64 MMHG | OXYGEN SATURATION: 100 % | SYSTOLIC BLOOD PRESSURE: 118 MMHG | WEIGHT: 225 LBS | BODY MASS INDEX: 30.48 KG/M2

## 2018-07-26 DIAGNOSIS — Z98.890 HISTORY OF AAA (ABDOMINAL AORTIC ANEURYSM) REPAIR: ICD-10-CM

## 2018-07-26 DIAGNOSIS — N52.1 ERECTILE DYSFUNCTION DUE TO DISEASES CLASSIFIED ELSEWHERE: ICD-10-CM

## 2018-07-26 DIAGNOSIS — E11.9 TYPE 2 DIABETES MELLITUS WITHOUT COMPLICATION, WITHOUT LONG-TERM CURRENT USE OF INSULIN (HCC): ICD-10-CM

## 2018-07-26 DIAGNOSIS — E11.8 TYPE 2 DIABETES MELLITUS WITH COMPLICATION, WITHOUT LONG-TERM CURRENT USE OF INSULIN (HCC): ICD-10-CM

## 2018-07-26 DIAGNOSIS — I15.2 HYPERTENSION DUE TO ENDOCRINE DISORDER: ICD-10-CM

## 2018-07-26 LAB
HBA1C MFR BLD: 6 % (ref ?–5.8)
INT CON NEG: NEGATIVE
INT CON POS: POSITIVE

## 2018-07-26 PROCEDURE — 83036 HEMOGLOBIN GLYCOSYLATED A1C: CPT | Performed by: FAMILY MEDICINE

## 2018-07-26 PROCEDURE — 99214 OFFICE O/P EST MOD 30 MIN: CPT | Performed by: FAMILY MEDICINE

## 2018-07-26 RX ORDER — SILDENAFIL 50 MG/1
50 TABLET, FILM COATED ORAL PRN
Qty: 6 TAB | Refills: 2 | Status: SHIPPED | OUTPATIENT
Start: 2018-07-26 | End: 2019-01-25

## 2018-07-26 ASSESSMENT — PATIENT HEALTH QUESTIONNAIRE - PHQ9: CLINICAL INTERPRETATION OF PHQ2 SCORE: 0

## 2018-07-26 NOTE — PROGRESS NOTES
Complaint: Establish care     Subjective:     Quirino Barker is a 57 y.o. male here today to establish care. Previously followed by provider in [facility]. Lives in [place].     Erectile dysfunction due to diseases classified elsewhere  Trouble getting and keeping an erection. Would like to try Viagra. No CI.    Type 2 diabetes mellitus without complication, without long-term current use of insulin (HCC)  BS checked 2-3x/week, running 130's. Denies any tingling/numbness in feet/hands, no rashes. Gets eyes checked yearly. Compliant with medication. Walks and lifts/carrys lots at work. Unfortunately also like to eat well.     History of AAA (abdominal aortic aneurysm) repair  Found to have AAA during exam prior to colonoscopy. Repaired with stents.    Hypertension due to endocrine disorder  Compliant with taking his Prinivil 5mg daily.     No other concerns or complaints today.    Current medicines (including changes today)  Current Outpatient Prescriptions   Medication Sig Dispense Refill   • aspirin 81 MG tablet Take 81 mg by mouth every day.     • sildenafil citrate (VIAGRA) 50 MG tablet Take 1 Tab by mouth as needed for Erectile Dysfunction. 6 Tab 2   • glipiZIDE (GLUCOTROL) 5 MG Tab TAKE 1 TAB BY MOUTH 2 TIMES A DAY. 60 Tab 2   • metFORMIN ER (GLUCOPHAGE XR) 500 MG TABLET SR 24 HR TAKE 2 TABLETS BY MOUTH 2 TIMES A DAY, WITH MEALS. 360 Tab 0   • simvastatin (ZOCOR) 20 MG Tab TAKE 1 TABLET BY MOUTH EVERY EVENING. 90 Tab 1   • lisinopril (PRINIVIL) 5 MG Tab TAKE 1 TABLET BY MOUTH EVERY DAY 90 Tab 1   • Multiple Vitamins-Minerals (CENTRUM SILVER ADULT 50+) Tab Take  by mouth.       No current facility-administered medications for this visit.      He  has a past medical history of Diabetes (HCC); Hyperlipidemia; and Hypertension.    Health Maintenance: immunization updates at f/u      Allergies: Patient has no known allergies.    Current Outpatient Prescriptions Ordered in [facility]   Medication Sig Dispense Refill    • aspirin 81 MG tablet Take 81 mg by mouth every day.     • sildenafil citrate (VIAGRA) 50 MG tablet Take 1 Tab by mouth as needed for Erectile Dysfunction. 6 Tab 2   • glipiZIDE (GLUCOTROL) 5 MG Tab TAKE 1 TAB BY MOUTH 2 TIMES A DAY. 60 Tab 2   • metFORMIN ER (GLUCOPHAGE XR) 500 MG TABLET SR 24 HR TAKE 2 TABLETS BY MOUTH 2 TIMES A DAY, WITH MEALS. 360 Tab 0   • simvastatin (ZOCOR) 20 MG Tab TAKE 1 TABLET BY MOUTH EVERY EVENING. 90 Tab 1   • lisinopril (PRINIVIL) 5 MG Tab TAKE 1 TABLET BY MOUTH EVERY DAY 90 Tab 1   • Multiple Vitamins-Minerals (CENTRUM SILVER ADULT 50+) Tab Take  by mouth.       No current Epic-ordered facility-administered medications on file.        Past Medical History:   Diagnosis Date   • Diabetes (HCC)    • Hyperlipidemia    • Hypertension        Past Surgical History:   Procedure Laterality Date   • OTHER      AAA repair incl. iliacs       Social History   Substance Use Topics   • Smoking status: Former Smoker     Packs/day: 0.50     Years: 45.00     Types: Cigarettes     Quit date: 4/17/2018   • Smokeless tobacco: Never Used   • Alcohol use No      Comment: special occasions 1-2 x year       Social History     Social History Narrative   • No narrative on file       Family History   Problem Relation Age of Onset   • Breast Cancer Mother    • Cancer Father          ROS  Patient denies any fever, chills, unintentional weight gain/loss, fatigue, stroke symptoms, dizziness, headache, nasal congestion, sore-throat, cough, heartburn, chest pain, difficulty breathing, abdominal discomfort, diarrhea/constipation, burning with urination or frequency, joint or back pain, skin rashes, depression or anxiety.       Objective:     Blood pressure 118/64, pulse 62, temperature 36.8 °C (98.2 °F), resp. rate 18, height 1.829 m (6'), weight 102.1 kg (225 lb), SpO2 100 %. Body mass index is 30.52 kg/m².   Physical Exam:  Constitutional: Alert, no distress.  Skin: Warm, dry, good turgor, no rashes in visible  areas.  Eye: Equal, round and reactive, conjunctiva clear, lids normal.  ENMT: Lips without lesions, good dentition, oropharynx clear.  Neck: Trachea midline, no masses, no thyromegaly. No cervical or supraclavicular lymphadenopathy  Respiratory: Unlabored respiratory effort, lungs clear to auscultation, no wheezes, no ronchi.  Cardiovascular: Normal S1, S2, no murmur, no extremity edema.    Diabetic Foot Exam:     Normal gross anatomy of bilateral feet without abnormal curvature or arch, no toe deformities  No ulcers/laceration/blisters present on bilateral feet,   No toenail discoloration or thickening, no ingrown toenails,   No difference in skin coloration or temperature between feet,   Bilateral dorsalis pedis and posterior tibial pulses 2+ and equal, Refill less than 2 seconds bilaterally,   Lorenzo 10 g monofilament testing normal in bilateral great toes, bilateral balls of feet at medial/lateral/mid ball of foot    Psych: Alert and oriented x3, appropriate affect and mood.        Assessment and Plan:   The following treatment plan was discussed    1. Type 2 diabetes mellitus without complication, without long-term current use of insulin (HCC)  Chronic problem. Controlled on current treatment. HbA1c 6.0% today. Labs prior to next visit.   - CBC WITH DIFFERENTIAL; Future  - COMP METABOLIC PANEL; Future  - LIPID PROFILE; Future  - MICROALBUMIN CREAT RATIO URINE; Future    2. Erectile dysfunction due to diseases classified elsewhere  New problem. Discussed treatment options; would prefer Cialis but plan won't cover. Reviewed possible common side-effects.   - sildenafil citrate (VIAGRA) 50 MG tablet; Take 1 Tab by mouth as needed for Erectile Dysfunction.  Dispense: 6 Tab; Refill: 2    3. BMI 30.0-30.9,adult  Chronic problem.  - Patient identified as having weight management issue.  Appropriate orders and counseling given.      4. Hypertension due to endocrine disorder  Chronic problem, controlled on current  medical treatment.      Followup: Return in about 3 months (around 10/26/2018), or DM.    Please note that this dictation was created using voice recognition software. I have made every reasonable attempt to correct obvious errors, but I expect that there are errors of grammar and possibly content that I did not discover before finalizing the note.

## 2018-07-26 NOTE — ASSESSMENT & PLAN NOTE
BS checked 2-3x/week, running 130's. Denies any tingling/numbness in feet/hands, no rashes. Gets eyes checked yearly. Compliant with medication. Walks and lifts/carrys lots at work. Unfortunately also like to eat well.

## 2018-10-18 ENCOUNTER — HOSPITAL ENCOUNTER (OUTPATIENT)
Dept: LAB | Facility: MEDICAL CENTER | Age: 58
End: 2018-10-18
Attending: FAMILY MEDICINE
Payer: COMMERCIAL

## 2018-10-18 DIAGNOSIS — E11.9 TYPE 2 DIABETES MELLITUS WITHOUT COMPLICATION, WITHOUT LONG-TERM CURRENT USE OF INSULIN (HCC): ICD-10-CM

## 2018-10-18 LAB
ALBUMIN SERPL BCP-MCNC: 4.8 G/DL (ref 3.2–4.9)
ALBUMIN/GLOB SERPL: 1.7 G/DL
ALP SERPL-CCNC: 76 U/L (ref 30–99)
ALT SERPL-CCNC: 26 U/L (ref 2–50)
ANION GAP SERPL CALC-SCNC: 4 MMOL/L (ref 0–11.9)
AST SERPL-CCNC: 23 U/L (ref 12–45)
BASOPHILS # BLD AUTO: 0.8 % (ref 0–1.8)
BASOPHILS # BLD: 0.07 K/UL (ref 0–0.12)
BILIRUB SERPL-MCNC: 0.5 MG/DL (ref 0.1–1.5)
BUN SERPL-MCNC: 19 MG/DL (ref 8–22)
CALCIUM SERPL-MCNC: 10 MG/DL (ref 8.5–10.5)
CHLORIDE SERPL-SCNC: 106 MMOL/L (ref 96–112)
CHOLEST SERPL-MCNC: 115 MG/DL (ref 100–199)
CO2 SERPL-SCNC: 28 MMOL/L (ref 20–33)
CREAT SERPL-MCNC: 0.96 MG/DL (ref 0.5–1.4)
CREAT UR-MCNC: 148.2 MG/DL
EOSINOPHIL # BLD AUTO: 0.25 K/UL (ref 0–0.51)
EOSINOPHIL NFR BLD: 2.7 % (ref 0–6.9)
ERYTHROCYTE [DISTWIDTH] IN BLOOD BY AUTOMATED COUNT: 47.8 FL (ref 35.9–50)
GLOBULIN SER CALC-MCNC: 2.9 G/DL (ref 1.9–3.5)
GLUCOSE SERPL-MCNC: 106 MG/DL (ref 65–99)
HCT VFR BLD AUTO: 47.4 % (ref 42–52)
HDLC SERPL-MCNC: 41 MG/DL
HGB BLD-MCNC: 15.3 G/DL (ref 14–18)
IMM GRANULOCYTES # BLD AUTO: 0.02 K/UL (ref 0–0.11)
IMM GRANULOCYTES NFR BLD AUTO: 0.2 % (ref 0–0.9)
LDLC SERPL CALC-MCNC: 52 MG/DL
LYMPHOCYTES # BLD AUTO: 2.49 K/UL (ref 1–4.8)
LYMPHOCYTES NFR BLD: 26.9 % (ref 22–41)
MCH RBC QN AUTO: 29.5 PG (ref 27–33)
MCHC RBC AUTO-ENTMCNC: 32.3 G/DL (ref 33.7–35.3)
MCV RBC AUTO: 91.5 FL (ref 81.4–97.8)
MICROALBUMIN UR-MCNC: 0.8 MG/DL
MICROALBUMIN/CREAT UR: 5 MG/G (ref 0–30)
MONOCYTES # BLD AUTO: 0.9 K/UL (ref 0–0.85)
MONOCYTES NFR BLD AUTO: 9.7 % (ref 0–13.4)
NEUTROPHILS # BLD AUTO: 5.51 K/UL (ref 1.82–7.42)
NEUTROPHILS NFR BLD: 59.7 % (ref 44–72)
NRBC # BLD AUTO: 0 K/UL
NRBC BLD-RTO: 0 /100 WBC
PLATELET # BLD AUTO: 265 K/UL (ref 164–446)
PMV BLD AUTO: 9.4 FL (ref 9–12.9)
POTASSIUM SERPL-SCNC: 4.4 MMOL/L (ref 3.6–5.5)
PROT SERPL-MCNC: 7.7 G/DL (ref 6–8.2)
RBC # BLD AUTO: 5.18 M/UL (ref 4.7–6.1)
SODIUM SERPL-SCNC: 138 MMOL/L (ref 135–145)
TRIGL SERPL-MCNC: 108 MG/DL (ref 0–149)
WBC # BLD AUTO: 9.2 K/UL (ref 4.8–10.8)

## 2018-10-18 PROCEDURE — 85025 COMPLETE CBC W/AUTO DIFF WBC: CPT

## 2018-10-18 PROCEDURE — 36415 COLL VENOUS BLD VENIPUNCTURE: CPT

## 2018-10-18 PROCEDURE — 80061 LIPID PANEL: CPT

## 2018-10-18 PROCEDURE — 80053 COMPREHEN METABOLIC PANEL: CPT

## 2018-10-18 PROCEDURE — 82570 ASSAY OF URINE CREATININE: CPT

## 2018-10-18 PROCEDURE — 82043 UR ALBUMIN QUANTITATIVE: CPT

## 2018-10-25 ENCOUNTER — OFFICE VISIT (OUTPATIENT)
Dept: MEDICAL GROUP | Facility: CLINIC | Age: 58
End: 2018-10-25
Payer: COMMERCIAL

## 2018-10-25 VITALS
WEIGHT: 227 LBS | HEIGHT: 72 IN | HEART RATE: 77 BPM | TEMPERATURE: 98.6 F | RESPIRATION RATE: 16 BRPM | SYSTOLIC BLOOD PRESSURE: 136 MMHG | DIASTOLIC BLOOD PRESSURE: 72 MMHG | OXYGEN SATURATION: 96 % | BODY MASS INDEX: 30.75 KG/M2

## 2018-10-25 DIAGNOSIS — E11.9 TYPE 2 DIABETES MELLITUS WITHOUT COMPLICATION, WITHOUT LONG-TERM CURRENT USE OF INSULIN (HCC): ICD-10-CM

## 2018-10-25 DIAGNOSIS — I15.2 HYPERTENSION DUE TO ENDOCRINE DISORDER: ICD-10-CM

## 2018-10-25 DIAGNOSIS — Z12.11 ENCOUNTER FOR SCREENING FECAL OCCULT BLOOD TESTING: ICD-10-CM

## 2018-10-25 DIAGNOSIS — E78.5 HYPERLIPIDEMIA ASSOCIATED WITH TYPE 2 DIABETES MELLITUS (HCC): ICD-10-CM

## 2018-10-25 DIAGNOSIS — E11.69 HYPERLIPIDEMIA ASSOCIATED WITH TYPE 2 DIABETES MELLITUS (HCC): ICD-10-CM

## 2018-10-25 DIAGNOSIS — Z23 NEED FOR VACCINATION: ICD-10-CM

## 2018-10-25 PROCEDURE — 90715 TDAP VACCINE 7 YRS/> IM: CPT | Performed by: FAMILY MEDICINE

## 2018-10-25 PROCEDURE — 99214 OFFICE O/P EST MOD 30 MIN: CPT | Mod: 25 | Performed by: FAMILY MEDICINE

## 2018-10-25 PROCEDURE — 90471 IMMUNIZATION ADMIN: CPT | Performed by: FAMILY MEDICINE

## 2018-10-25 NOTE — ASSESSMENT & PLAN NOTE
Latest FLP     Results for MOE GARCIA (MRN [id number]) as of 10/25/2018 08:21   Ref. Range 10/18/2018 09:12   Cholesterol,Tot Latest Ref Range: 100 - 199 mg/dL 115   Triglycerides Latest Ref Range: 0 - 149 mg/dL 108   HDL Latest Ref Range: >=40 mg/dL 41   LDL Latest Ref Range: <100 mg/dL 52     On simvastatin 20 mg qd, tolerating well.

## 2018-10-25 NOTE — ASSESSMENT & PLAN NOTE
Not checking his BS. Last A1c 6.0%. Controlled on glipizide and metformin. No hypo spells. Getting his eyes checked next week. No recent dental exam. No rash, no tingling or numbness hands or feet.

## 2018-10-25 NOTE — PROGRESS NOTES
Complaint:f/u labs     Subjective:     Moe Barker is a 58 y.o. male here today for routine f/u.    Hypertension due to endocrine disorder  Controlled on Prinivil 5 mg.    Hyperlipidemia associated with type 2 diabetes mellitus (CMS-[facility])  Latest FLP     Results for MOE BARKER (MRN [id number]) as of 10/25/2018 08:21   Ref. Range 10/18/2018 09:12   Cholesterol,Tot Latest Ref Range: 100 - 199 mg/dL 115   Triglycerides Latest Ref Range: 0 - 149 mg/dL 108   HDL Latest Ref Range: >=40 mg/dL 41   LDL Latest Ref Range: <100 mg/dL 52     On simvastatin 20 mg qd, tolerating well.    Type 2 diabetes mellitus without complication, without long-term current use of insulin ([facility])  Not checking his BS. Last A1c 6.0%. Controlled on glipizide and metformin. No hypo spells. Getting his eyes checked next week. No recent dental exam. No rash, no tingling or numbness hands or feet.       Labs otherwise all wnl.     Does not need med refills.    No other concerns or complaints today.    Current medicines (including changes today)  Current Outpatient Prescriptions   Medication Sig Dispense Refill   • glipiZIDE (GLUCOTROL) 5 MG Tab TAKE 1 TABLET BY MOUTH TWICE A DAY 60 Tab 2   • lisinopril (PRINIVIL) 5 MG Tab TAKE 1 TABLET BY MOUTH EVERY DAY 90 Tab 1   • simvastatin (ZOCOR) 20 MG Tab TAKE 1 TABLET BY MOUTH EVERY EVENING. 90 Tab 1   • metFORMIN ER (GLUCOPHAGE XR) 500 MG TABLET SR 24 HR TAKE 2 TABLETS BY MOUTH 2 TIMES A DAY, WITH MEALS. 360 Tab 0   • aspirin 81 MG tablet Take 81 mg by mouth every day.     • Multiple Vitamins-Minerals (CENTRUM SILVER ADULT 50+) Tab Take  by mouth.     • sildenafil citrate (VIAGRA) 50 MG tablet Take 1 Tab by mouth as needed for Erectile Dysfunction. 6 Tab 2     No current facility-administered medications for this visit.      He  has a past medical history of Diabetes ([facility]); Hyperlipidemia; and Hypertension.    Health Maintenance: declines all immunizations apart from Tdap.      Allergies: Patient has no known  "allergies.    Current Outpatient Prescriptions Ordered in [facility]   Medication Sig Dispense Refill   • glipiZIDE (GLUCOTROL) 5 MG Tab TAKE 1 TABLET BY MOUTH TWICE A DAY 60 Tab 2   • lisinopril (PRINIVIL) 5 MG Tab TAKE 1 TABLET BY MOUTH EVERY DAY 90 Tab 1   • simvastatin (ZOCOR) 20 MG Tab TAKE 1 TABLET BY MOUTH EVERY EVENING. 90 Tab 1   • metFORMIN ER (GLUCOPHAGE XR) 500 MG TABLET SR 24 HR TAKE 2 TABLETS BY MOUTH 2 TIMES A DAY, WITH MEALS. 360 Tab 0   • aspirin 81 MG tablet Take 81 mg by mouth every day.     • Multiple Vitamins-Minerals (CENTRUM SILVER ADULT 50+) Tab Take  by mouth.     • sildenafil citrate (VIAGRA) 50 MG tablet Take 1 Tab by mouth as needed for Erectile Dysfunction. 6 Tab 2     No current Epic-ordered facility-administered medications on file.        Past Medical History:   Diagnosis Date   • Diabetes (HCC)    • Hyperlipidemia    • Hypertension        Past Surgical History:   Procedure Laterality Date   • OTHER      AAA repair incl. iliacs       Social History   Substance Use Topics   • Smoking status: Current Every Day Smoker     Packs/day: 0.50     Years: 1.00     Types: Cigarettes     Last attempt to quit: 4/17/2018   • Smokeless tobacco: Never Used   • Alcohol use No      Comment: special occasions 1-2 x year       Social History     Social History Narrative   • No narrative on file       Family History   Problem Relation Age of Onset   • Breast Cancer Mother    • Cancer Father    • Heart Disease Father          ROS   Patient denies any fever, chills, unintentional weight gain/loss, fatigue, stroke symptoms, dizziness, headache, nasal congestion, sore-throat, cough, heartburn, chest pain, difficulty breathing, abdominal discomfort, diarrhea/constipation, burning with urination or frequency, joint or back pain, skin rashes, depression or anxiety.       Objective:     Blood pressure 136/72, pulse 77, temperature 37 °C (98.6 °F), temperature source Temporal, resp. rate 16, height 1.816 m (5' 11.5\"), " weight 103 kg (227 lb), SpO2 96 %. Body mass index is 31.22 kg/m².   Physical Exam:  Constitutional: Alert, no distress.  No formal examPsych: Alert and oriented x3, appropriate affect and mood.        Assessment and Plan:   The following treatment plan was discussed    1. Hypertension due to endocrine disorder  Controlled on meds. Needs to lose weight!!!    2. Encounter for screening fecal occult blood testing  Had positive test last year, attributed to AAA. Will be referred to his GI if positive again.  - OCCULT BLOOD FECES IMMUNOASSAY; Future    3. Need for vaccination    - Tdap Vaccine =>8YO IM    4. Type 2 diabetes mellitus without complication, without long-term current use of insulin (HCC)  Controlled on meds. Needs to lose weight!!    5. Hyperlipidemia associated with type 2 diabetes mellitus (HCC)  Controlled on meds.      Followup: Return in about 3 months (around 1/25/2019).    Please note that this dictation was created using voice recognition software. I have made every reasonable attempt to correct obvious errors, but I expect that there are errors of grammar and possibly content that I did not discover before finalizing the note.

## 2018-11-29 ENCOUNTER — HOSPITAL ENCOUNTER (OUTPATIENT)
Facility: MEDICAL CENTER | Age: 58
End: 2018-11-29
Attending: FAMILY MEDICINE
Payer: COMMERCIAL

## 2018-11-29 PROCEDURE — 82274 ASSAY TEST FOR BLOOD FECAL: CPT

## 2018-12-08 DIAGNOSIS — Z12.11 ENCOUNTER FOR SCREENING FECAL OCCULT BLOOD TESTING: ICD-10-CM

## 2018-12-10 LAB — HEMOCCULT STL QL IA: NEGATIVE

## 2019-01-18 ENCOUNTER — TELEPHONE (OUTPATIENT)
Dept: MEDICAL GROUP | Facility: PHYSICIAN GROUP | Age: 59
End: 2019-01-18

## 2019-01-18 NOTE — TELEPHONE ENCOUNTER
----- Message from Quirino Barker sent at 1/17/2019  7:50 PM PST -----  Regarding: Non-Urgent Medical Question  Contact: [phone]  Also need prescription for A1c testing

## 2019-01-25 ENCOUNTER — OFFICE VISIT (OUTPATIENT)
Dept: MEDICAL GROUP | Facility: CLINIC | Age: 59
End: 2019-01-25
Payer: COMMERCIAL

## 2019-01-25 VITALS
DIASTOLIC BLOOD PRESSURE: 80 MMHG | HEIGHT: 71 IN | RESPIRATION RATE: 16 BRPM | WEIGHT: 225 LBS | BODY MASS INDEX: 31.5 KG/M2 | OXYGEN SATURATION: 95 % | TEMPERATURE: 98.6 F | SYSTOLIC BLOOD PRESSURE: 150 MMHG | HEART RATE: 93 BPM

## 2019-01-25 DIAGNOSIS — E11.69 HYPERLIPIDEMIA ASSOCIATED WITH TYPE 2 DIABETES MELLITUS (HCC): ICD-10-CM

## 2019-01-25 DIAGNOSIS — N52.1 ERECTILE DYSFUNCTION DUE TO DISEASES CLASSIFIED ELSEWHERE: ICD-10-CM

## 2019-01-25 DIAGNOSIS — E78.5 HYPERLIPIDEMIA ASSOCIATED WITH TYPE 2 DIABETES MELLITUS (HCC): ICD-10-CM

## 2019-01-25 DIAGNOSIS — I10 ESSENTIAL HYPERTENSION: ICD-10-CM

## 2019-01-25 DIAGNOSIS — E11.9 TYPE 2 DIABETES MELLITUS WITHOUT COMPLICATION, WITHOUT LONG-TERM CURRENT USE OF INSULIN (HCC): ICD-10-CM

## 2019-01-25 LAB
HBA1C MFR BLD: 6.4 % (ref ?–5.8)
INT CON NEG: NEGATIVE
INT CON POS: POSITIVE

## 2019-01-25 PROCEDURE — 83036 HEMOGLOBIN GLYCOSYLATED A1C: CPT | Performed by: FAMILY MEDICINE

## 2019-01-25 PROCEDURE — 99214 OFFICE O/P EST MOD 30 MIN: CPT | Performed by: FAMILY MEDICINE

## 2019-01-25 ASSESSMENT — PATIENT HEALTH QUESTIONNAIRE - PHQ9: CLINICAL INTERPRETATION OF PHQ2 SCORE: 0

## 2019-01-25 NOTE — PROGRESS NOTES
Complaint: routine f/u.     Subjective:     Quirino Barker is a 58 y.o. male here today for f/u. Went to [facility], had screening labs done.    Type 2 diabetes mellitus without complication, without long-term current use of insulin ([facility])  Recent labs:  A1c 6.0, GFR  89. Currently on Glucotrol 5 mg bid as well as Metformin  mg bid. Denies any low BS spells, tingling or numbness in extremities, rashes. Will get his eyes checked soon.    Essential hypertension  A bit upset today due to insurance issue. At screen, /60. Compliant with taking his Prinivil 5 mg daily.      Erectile dysfunction due to diseases classified elsewhere  Currently does not need Viagra.    Hyperlipidemia associated with type 2 diabetes mellitus (CMS-HCC)  Recent labs done at screen: Tchol 124, HDL 37, LDL 64, .  On Zocor 20 mg daily.     Complains of swollen feet and pain in heels end of working sday.    Current medicines (including changes today)  Current Outpatient Prescriptions   Medication Sig Dispense Refill   • metFORMIN ER (GLUCOPHAGE XR) 500 MG TABLET SR 24 HR Take 1 Tab by mouth 2 times a day. 360 Tab 0   • glipiZIDE (GLUCOTROL) 5 MG Tab TAKE 1 TABLET BY MOUTH TWICE A DAY 60 Tab 2   • lisinopril (PRINIVIL) 5 MG Tab TAKE 1 TABLET BY MOUTH EVERY DAY 90 Tab 1   • simvastatin (ZOCOR) 20 MG Tab TAKE 1 TABLET BY MOUTH EVERY EVENING. 90 Tab 1   • aspirin 81 MG tablet Take 81 mg by mouth every day.     • Multiple Vitamins-Minerals (CENTRUM SILVER ADULT 50+) Tab Take  by mouth.       No current facility-administered medications for this visit.      He  has a past medical history of Diabetes ([facility]); ED (erectile dysfunction); Hyperlipidemia; and Hypertension.    Health Maintenance: declines vaccinations.      Allergies: Patient has no known allergies.    Current Outpatient Prescriptions Ordered in [facility]   Medication Sig Dispense Refill   • metFORMIN ER (GLUCOPHAGE XR) 500 MG TABLET SR 24 HR Take 1 Tab by mouth 2 times a day. 360 Tab 0  "  • glipiZIDE (GLUCOTROL) 5 MG Tab TAKE 1 TABLET BY MOUTH TWICE A DAY 60 Tab 2   • lisinopril (PRINIVIL) 5 MG Tab TAKE 1 TABLET BY MOUTH EVERY DAY 90 Tab 1   • simvastatin (ZOCOR) 20 MG Tab TAKE 1 TABLET BY MOUTH EVERY EVENING. 90 Tab 1   • aspirin 81 MG tablet Take 81 mg by mouth every day.     • Multiple Vitamins-Minerals (CENTRUM SILVER ADULT 50+) Tab Take  by mouth.       No current Epic-ordered facility-administered medications on file.        Past Medical History:   Diagnosis Date   • Diabetes (HCC)    • ED (erectile dysfunction)    • Hyperlipidemia    • Hypertension        Past Surgical History:   Procedure Laterality Date   • OTHER      AAA repair incl. iliacs       Social History   Substance Use Topics   • Smoking status: Current Every Day Smoker     Packs/day: 0.50     Years: 1.00     Types: Cigarettes     Last attempt to quit: 4/17/2018   • Smokeless tobacco: Never Used   • Alcohol use No      Comment: special occasions 1-2 x year       Social History     Social History Narrative   • No narrative on file       Family History   Problem Relation Age of Onset   • Breast Cancer Mother    • Cancer Father    • Heart Disease Father          ROS Positive for pain in heels and swelling of feet   Patient denies any fever, chills, unintentional weight gain/loss, fatigue, stroke symptoms, dizziness, headache, nasal congestion, sore-throat, cough, heartburn, chest pain, difficulty breathing, abdominal discomfort, diarrhea/constipation, burning with urination or frequency, back pain, skin rashes, depression or anxiety.       Objective:     Blood pressure 150/80, pulse 93, temperature 37 °C (98.6 °F), temperature source Temporal, resp. rate 16, height 1.803 m (5' 11\"), weight 102.1 kg (225 lb), SpO2 95 %. Body mass index is 31.38 kg/m².   Physical Exam:  Constitutional: Alert, no distress.  No formal exam today.Psych: Alert and oriented x3, appropriate affect and mood.        Assessment and Plan:   The following " treatment plan was discussed    1. Type 2 diabetes mellitus without complication, without long-term current use of insulin (HCC)  A1c 6.4% today. Recommend he continue to watch his diet. No change meds. Gets retinal check.  - POCT Hemoglobin A1C    2. Essential hypertension  Controlled on meds.    3. Erectile dysfunction due to diseases classified elsewhere  Improved.    4. Hyperlipidemia associated with type 2 diabetes mellitus (HCC)  Controlled on meds.    Recommend comfortable fitting shoes at work, silicon heel inserts.    Will continue to refill meds until establish with new PCP.    Followup: Return in about 6 months (around 7/25/2019), or if symptoms worsen or fail to improve.    Please note that this dictation was created using voice recognition software. I have made every reasonable attempt to correct obvious errors, but I expect that there are errors of grammar and possibly content that I did not discover before finalizing the note.

## 2019-01-25 NOTE — ASSESSMENT & PLAN NOTE
A bit upset today due to insurance issue. At screen, /60. Compliant with taking his Prinivil 5 mg daily.

## 2019-01-25 NOTE — ASSESSMENT & PLAN NOTE
Recent labs:  A1c 6.0, GFR  89. Currently on Glucotrol 5 mg bid as well as Metformin  mg bid. Denies any low BS spells, tingling or numbness in extremities, rashes. Will get his eyes checked soon.

## 2019-12-31 RX ORDER — SIMVASTATIN 20 MG
TABLET ORAL
Qty: 30 TAB | Refills: 0 | Status: SHIPPED | OUTPATIENT
Start: 2019-12-31 | End: 2020-01-30

## 2020-01-02 RX ORDER — LISINOPRIL 5 MG/1
TABLET ORAL
Qty: 30 TAB | Refills: 0 | Status: SHIPPED | OUTPATIENT
Start: 2020-01-02 | End: 2020-01-30

## 2020-01-02 RX ORDER — METFORMIN HYDROCHLORIDE 500 MG/1
TABLET, EXTENDED RELEASE ORAL
Qty: 60 TAB | Refills: 0 | Status: SHIPPED | OUTPATIENT
Start: 2020-01-02 | End: 2020-01-30

## 2020-01-02 NOTE — TELEPHONE ENCOUNTER
Was the patient seen in the last year in this department? Yes    Does patient have an active prescription for medications requested? No     Received Request Via: Pharmacy      Pt met protocol?: No last ov 1/25/19 has no upcoming appt   BP Readings from Last 1 Encounters:   01/25/19 150/80     Lab Results   Component Value Date/Time    HBA1C 6.4 01/25/2019 08:38 AM

## 2020-01-30 ENCOUNTER — TELEPHONE (OUTPATIENT)
Dept: MEDICAL GROUP | Facility: CLINIC | Age: 60
End: 2020-01-30

## 2020-01-30 DIAGNOSIS — Z12.5 PROSTATE CANCER SCREENING: ICD-10-CM

## 2020-01-30 RX ORDER — METFORMIN HYDROCHLORIDE 500 MG/1
TABLET, EXTENDED RELEASE ORAL
Qty: 60 TAB | Refills: 0 | Status: SHIPPED | OUTPATIENT
Start: 2020-01-30 | End: 2020-02-26

## 2020-01-30 RX ORDER — SIMVASTATIN 20 MG
TABLET ORAL
Qty: 30 TAB | Refills: 0 | Status: SHIPPED | OUTPATIENT
Start: 2020-01-30 | End: 2020-02-26

## 2020-01-30 RX ORDER — LISINOPRIL 5 MG/1
TABLET ORAL
Qty: 30 TAB | Refills: 0 | Status: SHIPPED | OUTPATIENT
Start: 2020-01-30 | End: 2020-02-26

## 2020-01-30 NOTE — TELEPHONE ENCOUNTER
VOICEMAIL  1. Caller Name: Tod Houston                                              Call Back Number: [phone]    2. Message: Pt called and wanted to know if you would like him to get his yearly blood work done before his next apt. If yes please order so he can come and get them done    3. Patient approves office to leave a detailed voicemail/MyChart message: yes

## 2020-02-05 ENCOUNTER — NON-PROVIDER VISIT (OUTPATIENT)
Dept: MEDICAL GROUP | Facility: CLINIC | Age: 60
End: 2020-02-05
Payer: COMMERCIAL

## 2020-02-05 ENCOUNTER — HOSPITAL ENCOUNTER (OUTPATIENT)
Facility: MEDICAL CENTER | Age: 60
End: 2020-02-05
Attending: FAMILY MEDICINE
Payer: COMMERCIAL

## 2020-02-05 DIAGNOSIS — Z12.5 PROSTATE CANCER SCREENING: ICD-10-CM

## 2020-02-05 PROCEDURE — 83036 HEMOGLOBIN GLYCOSYLATED A1C: CPT

## 2020-02-05 PROCEDURE — 85025 COMPLETE CBC W/AUTO DIFF WBC: CPT

## 2020-02-05 PROCEDURE — 84153 ASSAY OF PSA TOTAL: CPT

## 2020-02-05 PROCEDURE — 80053 COMPREHEN METABOLIC PANEL: CPT

## 2020-02-05 PROCEDURE — 36415 COLL VENOUS BLD VENIPUNCTURE: CPT | Performed by: FAMILY MEDICINE

## 2020-02-06 LAB
ALBUMIN SERPL BCP-MCNC: 4.5 G/DL (ref 3.2–4.9)
ALBUMIN/GLOB SERPL: 1.5 G/DL
ALP SERPL-CCNC: 89 U/L (ref 30–99)
ALT SERPL-CCNC: 18 U/L (ref 2–50)
ANION GAP SERPL CALC-SCNC: 9 MMOL/L (ref 0–11.9)
AST SERPL-CCNC: 16 U/L (ref 12–45)
BASOPHILS # BLD AUTO: 0.5 % (ref 0–1.8)
BASOPHILS # BLD: 0.05 K/UL (ref 0–0.12)
BILIRUB SERPL-MCNC: 0.4 MG/DL (ref 0.1–1.5)
BUN SERPL-MCNC: 17 MG/DL (ref 8–22)
CALCIUM SERPL-MCNC: 10.1 MG/DL (ref 8.5–10.5)
CHLORIDE SERPL-SCNC: 102 MMOL/L (ref 96–112)
CO2 SERPL-SCNC: 27 MMOL/L (ref 20–33)
CREAT SERPL-MCNC: 0.94 MG/DL (ref 0.5–1.4)
EOSINOPHIL # BLD AUTO: 0.21 K/UL (ref 0–0.51)
EOSINOPHIL NFR BLD: 2.1 % (ref 0–6.9)
ERYTHROCYTE [DISTWIDTH] IN BLOOD BY AUTOMATED COUNT: 46.2 FL (ref 35.9–50)
EST. AVERAGE GLUCOSE BLD GHB EST-MCNC: 171 MG/DL
GLOBULIN SER CALC-MCNC: 3.1 G/DL (ref 1.9–3.5)
GLUCOSE SERPL-MCNC: 169 MG/DL (ref 65–99)
HBA1C MFR BLD: 7.6 % (ref 0–5.6)
HCT VFR BLD AUTO: 50.4 % (ref 42–52)
HGB BLD-MCNC: 16.1 G/DL (ref 14–18)
IMM GRANULOCYTES # BLD AUTO: 0.04 K/UL (ref 0–0.11)
IMM GRANULOCYTES NFR BLD AUTO: 0.4 % (ref 0–0.9)
LYMPHOCYTES # BLD AUTO: 3.31 K/UL (ref 1–4.8)
LYMPHOCYTES NFR BLD: 33.3 % (ref 22–41)
MCH RBC QN AUTO: 29.9 PG (ref 27–33)
MCHC RBC AUTO-ENTMCNC: 31.9 G/DL (ref 33.7–35.3)
MCV RBC AUTO: 93.7 FL (ref 81.4–97.8)
MONOCYTES # BLD AUTO: 0.82 K/UL (ref 0–0.85)
MONOCYTES NFR BLD AUTO: 8.2 % (ref 0–13.4)
NEUTROPHILS # BLD AUTO: 5.52 K/UL (ref 1.82–7.42)
NEUTROPHILS NFR BLD: 55.5 % (ref 44–72)
NRBC # BLD AUTO: 0 K/UL
NRBC BLD-RTO: 0 /100 WBC
PLATELET # BLD AUTO: 288 K/UL (ref 164–446)
PMV BLD AUTO: 9.7 FL (ref 9–12.9)
POTASSIUM SERPL-SCNC: 4.9 MMOL/L (ref 3.6–5.5)
PROT SERPL-MCNC: 7.6 G/DL (ref 6–8.2)
PSA SERPL-MCNC: 0.83 NG/ML (ref 0–4)
RBC # BLD AUTO: 5.38 M/UL (ref 4.7–6.1)
SODIUM SERPL-SCNC: 138 MMOL/L (ref 135–145)
WBC # BLD AUTO: 10 K/UL (ref 4.8–10.8)

## 2020-03-06 ENCOUNTER — OFFICE VISIT (OUTPATIENT)
Dept: MEDICAL GROUP | Facility: CLINIC | Age: 60
End: 2020-03-06
Payer: COMMERCIAL

## 2020-03-06 VITALS
HEIGHT: 72 IN | SYSTOLIC BLOOD PRESSURE: 132 MMHG | OXYGEN SATURATION: 97 % | TEMPERATURE: 98.2 F | DIASTOLIC BLOOD PRESSURE: 84 MMHG | HEART RATE: 74 BPM | RESPIRATION RATE: 16 BRPM | WEIGHT: 211.2 LBS | BODY MASS INDEX: 28.61 KG/M2

## 2020-03-06 DIAGNOSIS — Z12.12 SCREENING FOR COLORECTAL CANCER: ICD-10-CM

## 2020-03-06 DIAGNOSIS — I10 ESSENTIAL HYPERTENSION: ICD-10-CM

## 2020-03-06 DIAGNOSIS — Z12.11 SCREENING FOR COLORECTAL CANCER: ICD-10-CM

## 2020-03-06 DIAGNOSIS — L30.9 ECZEMA, UNSPECIFIED TYPE: ICD-10-CM

## 2020-03-06 DIAGNOSIS — Z23 NEED FOR VACCINATION: ICD-10-CM

## 2020-03-06 DIAGNOSIS — E11.9 TYPE 2 DIABETES MELLITUS WITHOUT COMPLICATION, WITHOUT LONG-TERM CURRENT USE OF INSULIN (HCC): ICD-10-CM

## 2020-03-06 PROBLEM — E66.3 OVERWEIGHT (BMI 25.0-29.9): Status: ACTIVE | Noted: 2020-03-06

## 2020-03-06 PROBLEM — R21 RASH: Status: ACTIVE | Noted: 2020-03-06

## 2020-03-06 PROCEDURE — 90732 PPSV23 VACC 2 YRS+ SUBQ/IM: CPT | Performed by: FAMILY MEDICINE

## 2020-03-06 PROCEDURE — 99214 OFFICE O/P EST MOD 30 MIN: CPT | Mod: 25 | Performed by: FAMILY MEDICINE

## 2020-03-06 PROCEDURE — 90471 IMMUNIZATION ADMIN: CPT | Performed by: FAMILY MEDICINE

## 2020-03-06 RX ORDER — TRIAMCINOLONE ACETONIDE 1 MG/G
1 CREAM TOPICAL 2 TIMES DAILY
Qty: 30 G | Refills: 2 | Status: SHIPPED | OUTPATIENT
Start: 2020-03-06 | End: 2020-08-13 | Stop reason: SDUPTHER

## 2020-03-06 RX ORDER — GLIPIZIDE 10 MG/1
10 TABLET, FILM COATED, EXTENDED RELEASE ORAL DAILY
Qty: 30 TAB | Refills: 5 | Status: SHIPPED | OUTPATIENT
Start: 2020-03-06 | End: 2020-08-13 | Stop reason: SDUPTHER

## 2020-03-06 ASSESSMENT — PATIENT HEALTH QUESTIONNAIRE - PHQ9: CLINICAL INTERPRETATION OF PHQ2 SCORE: 0

## 2020-03-06 ASSESSMENT — FIBROSIS 4 INDEX: FIB4 SCORE: 0.75

## 2020-03-06 NOTE — ASSESSMENT & PLAN NOTE
Has developed red itchy patches on side left calf ands right calf, some on right arm as well.  Wife treated the ones on legs with Tinactin cream, thinking they might be caused by fungus.

## 2020-03-06 NOTE — PROGRESS NOTES
Complaint: 6 mo f/u DM     Subjective:     Quirino Barker is a 59 y.o. male here today for f/u. i reviewed recent lab tests with him. Has one issue to discuss.    Type 2 diabetes mellitus without complication, without long-term current use of insulin ([facility])  Latest A1c 7.6%, despite losing 14 lbs. On Glucotrol 5 mg bid and metformin  mg bid. Denies any low BS spells. Saw Dr. Dejesus in November.    Rash  Has developed red itchy patches on side left calf ands right calf, some on right arm as well.  Wife treated the ones on legs with Tinactin cream, thinking they might be caused by fungus.     Essential hypertension  Managed with Prinivil 5 mg daily.     No other concerns or complaints today.    Current medicines (including changes today)  Current Outpatient Medications   Medication Sig Dispense Refill   • triamcinolone acetonide (KENALOG) 0.1 % Cream Apply 1 Application to affected area(s) 2 times a day. 30 g 2   • glipiZIDE SR (GLUCOTROL XL) 10 MG TABLET SR 24 HR Take 1 Tab by mouth every day. 30 Tab 5   • lisinopril (PRINIVIL) 5 MG Tab TAKE 1 TABLET BY MOUTH EVERY DAY 30 Tab 5   • metFORMIN ER (GLUCOPHAGE XR) 500 MG TABLET SR 24 HR TAKE 1 TABLET BY MOUTH TWICE A DAY 60 Tab 5   • simvastatin (ZOCOR) 20 MG Tab TAKE 1 TABLET BY MOUTH EVERY DAY IN THE EVENING 30 Tab 5   • aspirin 81 MG tablet Take 81 mg by mouth every day.     • Multiple Vitamins-Minerals (CENTRUM SILVER ADULT 50+) Tab Take  by mouth.       No current facility-administered medications for this visit.      He  has a past medical history of Diabetes ([facility]), ED (erectile dysfunction), Hyperlipidemia, and Hypertension.    Health Maintenance:      Allergies: Patient has no known allergies.    Current Outpatient Medications Ordered in Epic   Medication Sig Dispense Refill   • triamcinolone acetonide (KENALOG) 0.1 % Cream Apply 1 Application to affected area(s) 2 times a day. 30 g 2   • glipiZIDE SR (GLUCOTROL XL) 10 MG TABLET SR 24 HR Take 1 Tab by mouth  "every day. 30 Tab 5   • lisinopril (PRINIVIL) 5 MG Tab TAKE 1 TABLET BY MOUTH EVERY DAY 30 Tab 5   • metFORMIN ER (GLUCOPHAGE XR) 500 MG TABLET SR 24 HR TAKE 1 TABLET BY MOUTH TWICE A DAY 60 Tab 5   • simvastatin (ZOCOR) 20 MG Tab TAKE 1 TABLET BY MOUTH EVERY DAY IN THE EVENING 30 Tab 5   • aspirin 81 MG tablet Take 81 mg by mouth every day.     • Multiple Vitamins-Minerals (CENTRUM SILVER ADULT 50+) Tab Take  by mouth.       No current Epic-ordered facility-administered medications on file.        Past Medical History:   Diagnosis Date   • Diabetes (HCC)    • ED (erectile dysfunction)    • Hyperlipidemia    • Hypertension        Past Surgical History:   Procedure Laterality Date   • OTHER      AAA repair incl. iliacs       Social History     Tobacco Use   • Smoking status: Current Every Day Smoker     Packs/day: 0.50     Years: 1.00     Pack years: 0.50     Types: Cigarettes     Last attempt to quit: 2018     Years since quittin.8   • Smokeless tobacco: Never Used   Substance Use Topics   • Alcohol use: No     Alcohol/week: 0.0 oz     Comment: special occasions 1-2 x year   • Drug use: No       Social History     Social History Narrative   • Not on file       Family History   Problem Relation Age of Onset   • Breast Cancer Mother    • Cancer Father    • Heart Disease Father          ROS Positive for rash.  Patient denies any fever, chills, unintentional weight gain/loss, fatigue, stroke symptoms, dizziness, headache, nasal congestion, sore-throat, cough, heartburn, chest pain, difficulty breathing, abdominal discomfort, diarrhea/constipation, burning with urination or frequency, joint or back pain,  depression or anxiety.       Objective:     /84 (BP Location: Left arm, Patient Position: Sitting, BP Cuff Size: Adult)   Pulse 74   Temp 36.8 °C (98.2 °F) (Temporal)   Resp 16   Ht 1.816 m (5' 11.5\")   Wt 95.8 kg (211 lb 3.2 oz)   SpO2 97%  Body mass index is 29.05 kg/m².   Physical " Exam:  Constitutional: Alert, no distress.  Skin: Warm, dry, good turgor, A couple small round scaly lesions outside right upper arm; red patches, one large just above right ankle, a couple small ones above left ankle.    Diabetic Foot Exam:     Normal gross anatomy of bilateral feet without abnormal curvature or arch, no toe deformities  No ulcers/laceration/blisters present on bilateral feet,   No toenail discoloration or thickening, no ingrown toenails,   No difference in skin coloration or temperature between feet,   Bilateral dorsalis pedis and posterior tibial pulses 2+ and equal, Refill less than 2 seconds bilaterally,   Buckner 10 g monofilament testing normal in bilateral great toes, bilateral balls of feet at medial/lateral/mid ball of foot      Assessment and Plan:   The following treatment plan was discussed    1. Eczema, unspecified type  Trial of therapy, patient to report back in 2 weeks.   - triamcinolone acetonide (KENALOG) 0.1 % Cream; Apply 1 Application to affected area(s) 2 times a day.  Dispense: 30 g; Refill: 2    2. Need for vaccination  - Pneumococal Polysaccharide Vaccine 23-Valent =>1YO SQ/IM    3. Uncontrolled type 2 diabetes mellitus without complication, without long-term current use of insulin (HCC)  Uncontrolled currently. Will increase his Glucotrol to 10 mg.  - glipiZIDE SR (GLUCOTROL XL) 10 MG TABLET SR 24 HR; Take 1 Tab by mouth every day.  Dispense: 30 Tab; Refill: 5    4. Essential hypertension  Controlled on meds    5. Screening for colorectal cancer  Will notify with result.  - OCCULT BLOOD FECES IMMUNOASSAY (FIT); Future      Followup: Return in about 4 months (around 7/6/2020).    Please note that this dictation was created using voice recognition software. I have made every reasonable attempt to correct obvious errors, but I expect that there are errors of grammar and possibly content that I did not discover before finalizing the note.

## 2020-03-06 NOTE — ASSESSMENT & PLAN NOTE
Latest A1c 7.6%, despite losing 14 lbs. On Glucotrol 5 mg bid and metformin  mg bid. Denies any low BS spells. Saw Dr. Dejesus in November.

## 2020-08-13 ENCOUNTER — OFFICE VISIT (OUTPATIENT)
Dept: MEDICAL GROUP | Facility: CLINIC | Age: 60
End: 2020-08-13
Payer: COMMERCIAL

## 2020-08-13 VITALS
HEIGHT: 71 IN | DIASTOLIC BLOOD PRESSURE: 66 MMHG | WEIGHT: 218 LBS | SYSTOLIC BLOOD PRESSURE: 124 MMHG | RESPIRATION RATE: 14 BRPM | OXYGEN SATURATION: 95 % | HEART RATE: 91 BPM | BODY MASS INDEX: 30.52 KG/M2 | TEMPERATURE: 97.3 F

## 2020-08-13 DIAGNOSIS — E11.69 HYPERLIPIDEMIA ASSOCIATED WITH TYPE 2 DIABETES MELLITUS (HCC): ICD-10-CM

## 2020-08-13 DIAGNOSIS — L30.9 ECZEMA, UNSPECIFIED TYPE: ICD-10-CM

## 2020-08-13 DIAGNOSIS — Z98.890 HISTORY OF AAA (ABDOMINAL AORTIC ANEURYSM) REPAIR: ICD-10-CM

## 2020-08-13 DIAGNOSIS — F17.200 NICOTINE DEPENDENCE WITH CURRENT USE: ICD-10-CM

## 2020-08-13 DIAGNOSIS — E66.9 OBESITY, CLASS I, BMI 30-34.9: ICD-10-CM

## 2020-08-13 DIAGNOSIS — E78.5 HYPERLIPIDEMIA ASSOCIATED WITH TYPE 2 DIABETES MELLITUS (HCC): ICD-10-CM

## 2020-08-13 DIAGNOSIS — R21 RASH: ICD-10-CM

## 2020-08-13 DIAGNOSIS — Z00.00 WELLNESS EXAMINATION: ICD-10-CM

## 2020-08-13 DIAGNOSIS — E11.9 TYPE 2 DIABETES MELLITUS WITHOUT COMPLICATION, WITHOUT LONG-TERM CURRENT USE OF INSULIN (HCC): ICD-10-CM

## 2020-08-13 DIAGNOSIS — I10 ESSENTIAL HYPERTENSION: ICD-10-CM

## 2020-08-13 PROBLEM — I71.40 ABDOMINAL AORTIC ANEURYSM WITHOUT RUPTURE (HCC): Status: ACTIVE | Noted: 2020-08-13

## 2020-08-13 PROBLEM — Z95.828 PRESENCE OF OTHER VASCULAR IMPLANTS AND GRAFTS: Status: ACTIVE | Noted: 2020-08-13

## 2020-08-13 PROBLEM — E66.811 OBESITY, CLASS I, BMI 30-34.9: Status: ACTIVE | Noted: 2020-03-06

## 2020-08-13 LAB
HBA1C MFR BLD: 6.7 % (ref 0–5.6)
INT CON NEG: NEGATIVE
INT CON POS: POSITIVE

## 2020-08-13 PROCEDURE — 99214 OFFICE O/P EST MOD 30 MIN: CPT | Performed by: PHYSICIAN ASSISTANT

## 2020-08-13 PROCEDURE — 83036 HEMOGLOBIN GLYCOSYLATED A1C: CPT | Performed by: PHYSICIAN ASSISTANT

## 2020-08-13 RX ORDER — CHOLECALCIFEROL (VITAMIN D3) 100000/G
POWDER (GRAM) MISCELLANEOUS
COMMUNITY
End: 2020-08-13 | Stop reason: CLARIF

## 2020-08-13 RX ORDER — SIMVASTATIN 20 MG
20 TABLET ORAL EVERY EVENING
Qty: 90 TAB | Refills: 3 | Status: SHIPPED | OUTPATIENT
Start: 2020-08-13 | End: 2021-08-25

## 2020-08-13 RX ORDER — TRIAMCINOLONE ACETONIDE 1 MG/G
1 CREAM TOPICAL 2 TIMES DAILY
Qty: 30 G | Refills: 2 | Status: SHIPPED | OUTPATIENT
Start: 2020-08-13 | End: 2021-08-25

## 2020-08-13 RX ORDER — METFORMIN HYDROCHLORIDE 500 MG/1
500 TABLET, EXTENDED RELEASE ORAL 2 TIMES DAILY
Qty: 180 TAB | Refills: 3 | Status: SHIPPED | OUTPATIENT
Start: 2020-08-13 | End: 2021-08-25

## 2020-08-13 RX ORDER — GLIPIZIDE 10 MG/1
10 TABLET, FILM COATED, EXTENDED RELEASE ORAL DAILY
Qty: 90 TAB | Refills: 3 | Status: SHIPPED | OUTPATIENT
Start: 2020-08-13 | End: 2021-08-25

## 2020-08-13 RX ORDER — SIMVASTATIN 20 MG
20 TABLET ORAL DAILY
COMMUNITY
End: 2020-08-13

## 2020-08-13 ASSESSMENT — FIBROSIS 4 INDEX: FIB4 SCORE: 0.76

## 2020-08-13 NOTE — ASSESSMENT & PLAN NOTE
Patient has smoked off and on since 1969.  He started smoking at the age of 9.  At his highest point he was smoking 2 to 2-1/2 packs/day for approximately 40 years.    Currently at approximately half a pack a day.  We have discussed the dangers of smoking on his cardiovascular health.  He does not wish to quit at this time.  We have discussed options when he decides he wants to quit we will be able to support that.  We will continue to follow.

## 2020-08-13 NOTE — ASSESSMENT & PLAN NOTE
This is a chronic condition for this patient.  His weight has remained steady over the last 6 months.  His A1c has improved by cutting out sweetened sodas, and cutting back on carbohydrates.  We have discussed ways to further modify his diet to both improve his diabetes and his weight.  He wants to try and improve his diet and see what impact that has before referrals are made to dietitian and diabetes educator.  We will continue to monitor

## 2020-08-13 NOTE — ASSESSMENT & PLAN NOTE
This is a chronic condition for this patient currently controlled on simvastatin 20 mg daily.  Has not had a lipid panel done since 2018.  We will recheck labs and adjust dosage if necessary.  Discussed the importance of diet and exercise to help control his cholesterol levels.  We will continue to follow.

## 2020-08-13 NOTE — ASSESSMENT & PLAN NOTE
This is a chronic condition for this patient.  Currently taking metformin  mg 1 tablet twice a day.  Also taking glipizide SR 10 mg once a day.  A1c in the office today is 6.7 which is much improved from the A1c in February that was 7.6.  We will continue on current medications at the current dosage and recheck A1c at his next visit.  We have discussed the importance of strict control of his diet and the addition of exercise.

## 2020-08-13 NOTE — ASSESSMENT & PLAN NOTE
Patient was referred for colonoscopy in early 2018.  During an exam with a gastroenterologist and abdominal aortic aneurysm was discovered.  An ultrasound done showed a AAA approximately 6 mm in size.  This was repaired via stent February 2018.  At the same time both femoral arteries were stented.  Patient now gets annual ultrasounds to check blood flow.  We will continue to follow.

## 2020-08-13 NOTE — PROGRESS NOTES
Chief Complaint   Patient presents with   • Hypertension     Est Care       HISTORY OF PRESENT ILLNESS: Patient is a 60 y.o. male established patient who presents today to discuss the following issues:    History of AAA (abdominal aortic aneurysm) repair  Patient was referred for colonoscopy in early 2018.  During an exam with a gastroenterologist and abdominal aortic aneurysm was discovered.  An ultrasound done showed a AAA approximately 6 mm in size.  This was repaired via stent February 2018.  At the same time both femoral arteries were stented.  Patient now gets annual ultrasounds to check blood flow.  We will continue to follow.    Essential hypertension  This is a chronic condition for this patient.  Currently well controlled on lisinopril 5 mg daily.  Blood pressure in the office today 124/66.  We will continue with his medication at the current dose and continue to monitor.    Hyperlipidemia associated with type 2 diabetes mellitus (CMS-[facility])  This is a chronic condition for this patient currently controlled on simvastatin 20 mg daily.  Has not had a lipid panel done since 2018.  We will recheck labs and adjust dosage if necessary.  Discussed the importance of diet and exercise to help control his cholesterol levels.  We will continue to follow.    Type 2 diabetes mellitus without complication, without long-term current use of insulin ([facility])  This is a chronic condition for this patient.  Currently taking metformin  mg 1 tablet twice a day.  Also taking glipizide SR 10 mg once a day.  A1c in the office today is 6.7 which is much improved from the A1c in February that was 7.6.  We will continue on current medications at the current dosage and recheck A1c at his next visit.  We have discussed the importance of strict control of his diet and the addition of exercise.    Nicotine dependence with current use  Patient has smoked off and on since 1969.  He started smoking at the age of 9.  At his highest point  he was smoking 2 to 2-1/2 packs/day for approximately 40 years.    Currently at approximately half a pack a day.  We have discussed the dangers of smoking on his cardiovascular health.  He does not wish to quit at this time.  We have discussed options when he decides he wants to quit we will be able to support that.  We will continue to follow.    Obesity, Class I, BMI 30-34.9  This is a chronic condition for this patient.  His weight has remained steady over the last 6 months.  His A1c has improved by cutting out sweetened sodas, and cutting back on carbohydrates.  We have discussed ways to further modify his diet to both improve his diabetes and his weight.  He wants to try and improve his diet and see what impact that has before referrals are made to dietitian and diabetes educator.  We will continue to monitor    Rash  Patient has dry raised patches on both calves.  States they start out looking like a pimple or a bug bite.  They itch become red and inflamed and spread.  Has tried treating them with antifungal cream without success.  Currently treating with triamcinolone cream 0.1% which helps with the itching and irritation but does not make the rash go away.  States a couple of weeks after stopping the use of the cream the rash comes back and he needs to start the cream over again.  We will research this further and may consider a consult to dermatology at next visit.      Patient Active Problem List    Diagnosis Date Noted   • Abdominal aortic aneurysm without rupture (HCC) 08/13/2020   • Presence of other vascular implants and grafts 08/13/2020   • Rash 03/06/2020   • Obesity, Class I, BMI 30-34.9 03/06/2020   • History of AAA (abdominal aortic aneurysm) repair 07/26/2018   • Type 2 diabetes mellitus without complication, without long-term current use of insulin (HCC) 07/26/2018   • Erectile dysfunction due to diseases classified elsewhere 07/26/2018   • Essential hypertension 04/25/2016   • Hyperlipidemia  associated with type 2 diabetes mellitus (HCC) 04/25/2016   • Nicotine dependence with current use 1967       Allergies:Patient has no known allergies.    Current Outpatient Medications   Medication Sig Dispense Refill   • cholecalciferol (VITAMIN D3) 400 UNIT Tab Take 400 Units by mouth every day.     • triamcinolone acetonide (KENALOG) 0.1 % Cream Apply 1 Application to affected area(s) 2 times a day. 30 g 2   • simvastatin (ZOCOR) 20 MG Tab Take 1 Tab by mouth every evening. 90 Tab 3   • glipiZIDE SR (GLUCOTROL XL) 10 MG TABLET SR 24 HR Take 1 Tab by mouth every day. 90 Tab 3   • metFORMIN ER (GLUCOPHAGE XR) 500 MG TABLET SR 24 HR Take 1 Tab by mouth 2 times a day. 180 Tab 3   • lisinopril (PRINIVIL) 5 MG Tab TAKE 1 TABLET BY MOUTH EVERY DAY 30 Tab 5   • aspirin 81 MG tablet Take 81 mg by mouth every day.     • Multiple Vitamins-Minerals (CENTRUM SILVER ADULT 50+) Tab Take  by mouth.       No current facility-administered medications for this visit.        No visits with results within 6 Month(s) from this visit.   Latest known visit with results is:   Hospital Outpatient Visit on 02/05/2020   Component Date Value Ref Range Status   • Prostatic Specific Antigen Tot 02/05/2020 0.83  0.00 - 4.00 ng/mL Final    Comment: The [employer] PSA assay is a paramagnetic particle,  chemiluminescent immunoassay for the quantitative determination  of total prostate specific antigen (PSA) levels using the  [employer] Immunoassay System. Values obtained with different  methods cannot be used interchangeably for patient monitoring.     • Sodium 02/05/2020 138  135 - 145 mmol/L Final   • Potassium 02/05/2020 4.9  3.6 - 5.5 mmol/L Final   • Chloride 02/05/2020 102  96 - 112 mmol/L Final   • Co2 02/05/2020 27  20 - 33 mmol/L Final   • Anion Gap 02/05/2020 9.0  0.0 - 11.9 Final   • Glucose 02/05/2020 169* 65 - 99 mg/dL Final   • Bun 02/05/2020 17  8 - 22 mg/dL Final   • Creatinine 02/05/2020 0.94  0.50 - 1.40 mg/dL Final   •  Calcium 02/05/2020 10.1  8.5 - 10.5 mg/dL Final   • AST(SGOT) 02/05/2020 16  12 - 45 U/L Final   • ALT(SGPT) 02/05/2020 18  2 - 50 U/L Final   • Alkaline Phosphatase 02/05/2020 89  30 - 99 U/L Final   • Total Bilirubin 02/05/2020 0.4  0.1 - 1.5 mg/dL Final   • Albumin 02/05/2020 4.5  3.2 - 4.9 g/dL Final   • Total Protein 02/05/2020 7.6  6.0 - 8.2 g/dL Final   • Globulin 02/05/2020 3.1  1.9 - 3.5 g/dL Final   • A-G Ratio 02/05/2020 1.5  g/dL Final   • Glycohemoglobin 02/05/2020 7.6* 0.0 - 5.6 % Final    Comment: Increased risk for diabetes:  5.7 -6.4%  Diabetes:  >6.4%  Glycemic control for adults with diabetes:  <7.0%  The above interpretations are per ADA guidelines.  Diagnosis  of diabetes mellitus on the basis of elevated Hemoglobin A1c  should be confirmed by repeating the Hb A1c test.     • Est Avg Glucose 02/05/2020 171  mg/dL Final    Comment: The eAG calculation is based on the A1c-Derived Daily Glucose  (ADAG) study.  See the ADA's website for additional information.     • WBC 02/05/2020 10.0  4.8 - 10.8 K/uL Final   • RBC 02/05/2020 5.38  4.70 - 6.10 M/uL Final   • Hemoglobin 02/05/2020 16.1  14.0 - 18.0 g/dL Final   • Hematocrit 02/05/2020 50.4  42.0 - 52.0 % Final   • MCV 02/05/2020 93.7  81.4 - 97.8 fL Final   • MCH 02/05/2020 29.9  27.0 - 33.0 pg Final   • MCHC 02/05/2020 31.9* 33.7 - 35.3 g/dL Final   • RDW 02/05/2020 46.2  35.9 - 50.0 fL Final   • Platelet Count 02/05/2020 288  164 - 446 K/uL Final   • MPV 02/05/2020 9.7  9.0 - 12.9 fL Final   • Neutrophils-Polys 02/05/2020 55.50  44.00 - 72.00 % Final   • Lymphocytes 02/05/2020 33.30  22.00 - 41.00 % Final   • Monocytes 02/05/2020 8.20  0.00 - 13.40 % Final   • Eosinophils 02/05/2020 2.10  0.00 - 6.90 % Final   • Basophils 02/05/2020 0.50  0.00 - 1.80 % Final   • Immature Granulocytes 02/05/2020 0.40  0.00 - 0.90 % Final   • Nucleated RBC 02/05/2020 0.00  /100 WBC Final   • Neutrophils (Absolute) 02/05/2020 5.52  1.82 - 7.42 K/uL Final    Includes  immature neutrophils, if present.   • Lymphs (Absolute) 2020 3.31  1.00 - 4.80 K/uL Final   • Monos (Absolute) 2020 0.82  0.00 - 0.85 K/uL Final   • Eos (Absolute) 2020 0.21  0.00 - 0.51 K/uL Final   • Baso (Absolute) 2020 0.05  0.00 - 0.12 K/uL Final   • Immature Granulocytes (abs) 2020 0.04  0.00 - 0.11 K/uL Final   • NRBC (Absolute) 2020 0.00  K/uL Final   • GFR If  2020 >60  >60 mL/min/1.73 m 2 Final   • GFR If Non  2020 >60  >60 mL/min/1.73 m 2 Final   ]    The ASCVD Risk score (Kenzie MARCANO Jr., et al., 2013) failed to calculate for the following reasons:    The valid total cholesterol range is 130 to 320 mg/dL    Social History     Tobacco Use   • Smoking status: Current Every Day Smoker     Packs/day: 0.75     Types: Cigarettes     Start date: 1980   • Smokeless tobacco: Never Used   Substance Use Topics   • Alcohol use: No     Alcohol/week: 0.0 oz     Comment: special occasions 1-2 x year   • Drug use: No       Family Status   Relation Name Status   • Mo  Alive        br ca   • Fa          pr ca   • Bro  Alive   • Bro  Alive   • Desmond 2.14.82 Alive   • Son 3.18.87 Alive     Family History   Problem Relation Age of Onset   • Breast Cancer Mother    • Cancer Father    • Heart Disease Father        Health Maintenance Summary                FASTING LIPID PROFILE Overdue 10/18/2019      Done 10/18/2018 LIPID PROFILE     Patient has more history with this topic...    URINE ACR / MICROALBUMIN Overdue 10/18/2019      Done 10/18/2018 MICROALBUMIN CREAT RATIO URINE     Patient has more history with this topic...    COLON CANCER SCREENING ANNUAL FIT Overdue 2019      Done 2018 OCCULT BLOOD FECES IMMUNOASSAY     Patient has more history with this topic...    A1C SCREENING Overdue 2020      Done 2020 HEMOGLOBIN A1C     Patient has more history with this topic...    HEPATITIS C SCREENING Postponed 2021  Originally 1960. Patient Refused    IMM HEP B VACCINE Postponed 8/31/2021 Originally 8/10/1981. Patient Refused    IMM ZOSTER VACCINES Postponed 8/31/2021 Originally 8/10/2012. Patient Refused    RETINAL SCREENING Next Due 11/6/2020      Done 11/6/2019      Patient has more history with this topic...    SERUM CREATININE Next Due 2/5/2021      Done 2/5/2020 COMP METABOLIC PANEL     Patient has more history with this topic...    DIABETES MONOFILAMENT / LE EXAM Next Due 3/6/2021      Done 3/6/2020      Patient has more history with this topic...    IMM DTaP/Tdap/Td Vaccine Next Due 10/25/2028      Done 10/25/2018 Imm Admin: Tdap Vaccine           Review of Systems:   Constitutional: Negative for fever, chills, weight change, fatigue, loss of appetite.  HNT: Negative for nosebleeds, congestion, odynophagia, sore throat or changes in taste.    Eyes: Negative for vision changes.   Ears: Negative for recent changes in hearing, pain or discharge.  Neck: Negative for pain, swelling, lumps or goiter.  Respiratory: Negative for cough, sputum production, shortness of breath and wheezing.    Cardiovascular: Negative for chest pain, palpitations, orthopnea and leg swelling.   Gastrointestinal: Negative for constipation, diarrhea, heartburn, dysphagia, nausea, vomiting or abdominal pain.   Genitourinary: Negative for dysuria, urgency and frequency.   Musculoskeletal: Negative for myalgias, joint pain, and back pain.  Skin: Positive for fungal changes of toenails, and skin rash on both calves.  Negative for skin, hair changes, itching.   Neurological: Negative for dizziness, tingling, tremors, sensory change, gait/coordination changes, focal weakness and headaches.   Endo/Heme/Allergies: Does not bruise/bleed easily.   Psychiatric/Behavioral: Negative for depression, suicidal ideas and memory loss.  The patient is not nervous/anxious and does not have insomnia.        Exam:  /66 (BP Location: Right arm, Patient Position:  "Sitting, BP Cuff Size: Adult)   Pulse 91   Temp 36.3 °C (97.3 °F) (Temporal)   Resp 14   Ht 1.803 m (5' 11\")   Wt 98.9 kg (218 lb)   SpO2 95%  Body mass index is 30.4 kg/m².  General:  Well nourished, mildly obese male. No apparent distress.  Eyes: EOM intact, PERRL, conjunctiva non-injected, sclera non-icteric.  Ears: Briseida pinnae, external auditory canals, TM pearly gray with normal light reflex bilaterally.  Neck: Supple with no cervical lymphadenopathy, JVD, palpable thyroid nodules or carotid bruits.  Pulmonary: Clear to ausculation bilaterally. Normal effort. No rales, ronchi, or wheezing.  Cardiovascular: Regular rate and rhythm without murmur, rub or gallop.   Abdomen: Palpable aortic pulse.  Soft, non-distended, non-tender with normal bowel sounds. No CVA tenderness  Extremities: Full range of motion. Warm and well perfused with no edema.  Skin: Intact with dry plaque type rashes over both calves.  Neuro: Cranial nerves I-XII intact.  Psych: Alert and oriented x 3.  Appropriately dressed. Mood and affect appropriate.      Assessment/Plan:  1. Type 2 diabetes mellitus without complication, without long-term current use of insulin (HCC)  POCT  A1C    Lipid Profile    glipiZIDE SR (GLUCOTROL XL) 10 MG TABLET SR 24 HR    metFORMIN ER (GLUCOPHAGE XR) 500 MG TABLET SR 24 HR    MICROALBUMIN CREAT RATIO URINE   2. Hyperlipidemia associated with type 2 diabetes mellitus (HCC)  Lipid Profile    simvastatin (ZOCOR) 20 MG Tab    Comp Metabolic Panel    Lipid Profile   3. Wellness examination  VITAMIN D,25 HYDROXY    CBC WITH DIFFERENTIAL    Comp Metabolic Panel    VITAMIN D,25 HYDROXY    Lipid Profile   4. Obesity, Class I, BMI 30-34.9  Comp Metabolic Panel    Patient identified as having weight management issue.  Appropriate orders and counseling given.   5. Essential hypertension  CBC WITH DIFFERENTIAL    Comp Metabolic Panel   6. History of AAA (abdominal aortic aneurysm) repair     7. Nicotine dependence with " current use     8. Eczema, unspecified type  triamcinolone acetonide (KENALOG) 0.1 % Cream    CBC WITH DIFFERENTIAL   9. Rash         Reviewed risks and benefits of treatment plan. Patient verbally agrees to plan of care.     Return in about 1 month (around 9/13/2020).    Please note that this dictation was created using voice recognition software. I have made every reasonable attempt to correct obvious errors, but I expect that there are errors of grammar and possibly content that I did not discover before finalizing the note.

## 2020-08-13 NOTE — ASSESSMENT & PLAN NOTE
This is a chronic condition for this patient.  Currently well controlled on lisinopril 5 mg daily.  Blood pressure in the office today 124/66.  We will continue with his medication at the current dose and continue to monitor.

## 2020-08-14 ENCOUNTER — HOSPITAL ENCOUNTER (OUTPATIENT)
Facility: MEDICAL CENTER | Age: 60
End: 2020-08-14
Attending: PHYSICIAN ASSISTANT
Payer: COMMERCIAL

## 2020-08-14 ENCOUNTER — NON-PROVIDER VISIT (OUTPATIENT)
Dept: MEDICAL GROUP | Facility: CLINIC | Age: 60
End: 2020-08-14
Payer: COMMERCIAL

## 2020-08-14 DIAGNOSIS — E11.9 TYPE 2 DIABETES MELLITUS WITHOUT COMPLICATION, WITHOUT LONG-TERM CURRENT USE OF INSULIN (HCC): ICD-10-CM

## 2020-08-14 DIAGNOSIS — Z00.00 WELLNESS EXAMINATION: ICD-10-CM

## 2020-08-14 DIAGNOSIS — E11.69 HYPERLIPIDEMIA ASSOCIATED WITH TYPE 2 DIABETES MELLITUS (HCC): ICD-10-CM

## 2020-08-14 DIAGNOSIS — E78.5 HYPERLIPIDEMIA ASSOCIATED WITH TYPE 2 DIABETES MELLITUS (HCC): ICD-10-CM

## 2020-08-14 DIAGNOSIS — I10 ESSENTIAL HYPERTENSION: ICD-10-CM

## 2020-08-14 DIAGNOSIS — L30.9 ECZEMA, UNSPECIFIED TYPE: ICD-10-CM

## 2020-08-14 DIAGNOSIS — E66.9 OBESITY, CLASS I, BMI 30-34.9: ICD-10-CM

## 2020-08-14 LAB
25(OH)D3 SERPL-MCNC: 58 NG/ML (ref 30–100)
ALBUMIN SERPL BCP-MCNC: 4.6 G/DL (ref 3.2–4.9)
ALBUMIN/GLOB SERPL: 1.6 G/DL
ALP SERPL-CCNC: 103 U/L (ref 30–99)
ALT SERPL-CCNC: 20 U/L (ref 2–50)
ANION GAP SERPL CALC-SCNC: 13 MMOL/L (ref 7–16)
AST SERPL-CCNC: 14 U/L (ref 12–45)
BASOPHILS # BLD AUTO: 0.5 % (ref 0–1.8)
BASOPHILS # BLD: 0.05 K/UL (ref 0–0.12)
BILIRUB SERPL-MCNC: 0.4 MG/DL (ref 0.1–1.5)
BUN SERPL-MCNC: 16 MG/DL (ref 8–22)
CALCIUM SERPL-MCNC: 9.8 MG/DL (ref 8.5–10.5)
CHLORIDE SERPL-SCNC: 100 MMOL/L (ref 96–112)
CHOLEST SERPL-MCNC: 120 MG/DL (ref 100–199)
CO2 SERPL-SCNC: 23 MMOL/L (ref 20–33)
CREAT SERPL-MCNC: 0.85 MG/DL (ref 0.5–1.4)
EOSINOPHIL # BLD AUTO: 0.22 K/UL (ref 0–0.51)
EOSINOPHIL NFR BLD: 2.3 % (ref 0–6.9)
ERYTHROCYTE [DISTWIDTH] IN BLOOD BY AUTOMATED COUNT: 47.4 FL (ref 35.9–50)
GLOBULIN SER CALC-MCNC: 2.9 G/DL (ref 1.9–3.5)
GLUCOSE SERPL-MCNC: 166 MG/DL (ref 65–99)
HCT VFR BLD AUTO: 51.3 % (ref 42–52)
HDLC SERPL-MCNC: 34 MG/DL
HGB BLD-MCNC: 16.4 G/DL (ref 14–18)
IMM GRANULOCYTES # BLD AUTO: 0.03 K/UL (ref 0–0.11)
IMM GRANULOCYTES NFR BLD AUTO: 0.3 % (ref 0–0.9)
LDLC SERPL CALC-MCNC: 56 MG/DL
LYMPHOCYTES # BLD AUTO: 2.34 K/UL (ref 1–4.8)
LYMPHOCYTES NFR BLD: 24.2 % (ref 22–41)
MCH RBC QN AUTO: 29.2 PG (ref 27–33)
MCHC RBC AUTO-ENTMCNC: 32 G/DL (ref 33.7–35.3)
MCV RBC AUTO: 91.4 FL (ref 81.4–97.8)
MONOCYTES # BLD AUTO: 0.73 K/UL (ref 0–0.85)
MONOCYTES NFR BLD AUTO: 7.6 % (ref 0–13.4)
NEUTROPHILS # BLD AUTO: 6.28 K/UL (ref 1.82–7.42)
NEUTROPHILS NFR BLD: 65.1 % (ref 44–72)
NRBC # BLD AUTO: 0 K/UL
NRBC BLD-RTO: 0 /100 WBC
PLATELET # BLD AUTO: 272 K/UL (ref 164–446)
PMV BLD AUTO: 9.8 FL (ref 9–12.9)
POTASSIUM SERPL-SCNC: 4.9 MMOL/L (ref 3.6–5.5)
PROT SERPL-MCNC: 7.5 G/DL (ref 6–8.2)
RBC # BLD AUTO: 5.61 M/UL (ref 4.7–6.1)
SODIUM SERPL-SCNC: 136 MMOL/L (ref 135–145)
TRIGL SERPL-MCNC: 151 MG/DL (ref 0–149)
WBC # BLD AUTO: 9.7 K/UL (ref 4.8–10.8)

## 2020-08-14 PROCEDURE — 80061 LIPID PANEL: CPT

## 2020-08-14 PROCEDURE — 85025 COMPLETE CBC W/AUTO DIFF WBC: CPT

## 2020-08-14 PROCEDURE — 36415 COLL VENOUS BLD VENIPUNCTURE: CPT | Performed by: PHYSICIAN ASSISTANT

## 2020-08-14 PROCEDURE — 82043 UR ALBUMIN QUANTITATIVE: CPT

## 2020-08-14 PROCEDURE — 82306 VITAMIN D 25 HYDROXY: CPT

## 2020-08-14 PROCEDURE — 82570 ASSAY OF URINE CREATININE: CPT

## 2020-08-14 PROCEDURE — 80053 COMPREHEN METABOLIC PANEL: CPT

## 2020-08-14 NOTE — ASSESSMENT & PLAN NOTE
Patient has dry raised patches on both calves.  States they start out looking like a pimple or a bug bite.  They itch become red and inflamed and spread.  Has tried treating them with antifungal cream without success.  Currently treating with triamcinolone cream 0.1% which helps with the itching and irritation but does not make the rash go away.  States a couple of weeks after stopping the use of the cream the rash comes back and he needs to start the cream over again.  We will research this further and may consider a consult to dermatology at next visit.

## 2020-08-15 LAB
CREAT UR-MCNC: 116.85 MG/DL
MICROALBUMIN UR-MCNC: <1.2 MG/DL
MICROALBUMIN/CREAT UR: NORMAL MG/G (ref 0–30)

## 2020-08-19 NOTE — RESULT ENCOUNTER NOTE
Please call the patient and relay the following:    The triglycerides on your lipid panel are slightly elevated.  These can be managed with diet and exercise as elevated triglycerides are usually due to high carbohydrate intake.    Your metabolic panel shows an elevated fasting glucose.  Any other highlighted results on this panel have been reviewed and are not concerning.    Your kidney function is within normal limits.  Your complete blood count is within normal limits.  Your vitamin D level is within normal limits.  We will discuss all of this further at your next appointment on September 18, 2020.

## 2020-09-18 ENCOUNTER — TELEPHONE (OUTPATIENT)
Dept: MEDICAL GROUP | Facility: CLINIC | Age: 60
End: 2020-09-18

## 2020-09-18 DIAGNOSIS — E11.69 ONYCHOMYCOSIS OF MULTIPLE TOENAILS WITH TYPE 2 DIABETES MELLITUS (HCC): ICD-10-CM

## 2020-09-18 DIAGNOSIS — B35.1 ONYCHOMYCOSIS OF MULTIPLE TOENAILS WITH TYPE 2 DIABETES MELLITUS (HCC): ICD-10-CM

## 2020-09-18 RX ORDER — TERBINAFINE HYDROCHLORIDE 250 MG/1
250 TABLET ORAL DAILY
Qty: 90 TAB | Refills: 0 | Status: SHIPPED | OUTPATIENT
Start: 2020-09-18 | End: 2022-01-26

## 2020-09-18 NOTE — TELEPHONE ENCOUNTER
Called patient to speak with him regarding treatment for his toenail fungus. The rash that he has developed on his calf is also the same type of fungus and will be treated with the same medication.  I will place a prescription for terbinafine 250 mg for 12 weeks.    Reviewed labs with him.  He will follow up with me after the first of the year for a recheck of A1c.    At that time he would like to have warts on his hand evaluated and treated.

## 2021-01-22 ENCOUNTER — OFFICE VISIT (OUTPATIENT)
Dept: MEDICAL GROUP | Facility: CLINIC | Age: 61
End: 2021-01-22
Payer: COMMERCIAL

## 2021-01-22 VITALS
BODY MASS INDEX: 30.8 KG/M2 | OXYGEN SATURATION: 95 % | DIASTOLIC BLOOD PRESSURE: 80 MMHG | RESPIRATION RATE: 18 BRPM | TEMPERATURE: 98 F | SYSTOLIC BLOOD PRESSURE: 128 MMHG | WEIGHT: 220 LBS | HEART RATE: 67 BPM | HEIGHT: 71 IN

## 2021-01-22 DIAGNOSIS — E11.9 TYPE 2 DIABETES MELLITUS WITHOUT COMPLICATION, WITHOUT LONG-TERM CURRENT USE OF INSULIN (HCC): ICD-10-CM

## 2021-01-22 LAB
HBA1C MFR BLD: 6.6 % (ref 0–5.6)
INT CON NEG: NEGATIVE
INT CON POS: POSITIVE

## 2021-01-22 PROCEDURE — 99213 OFFICE O/P EST LOW 20 MIN: CPT | Performed by: PHYSICIAN ASSISTANT

## 2021-01-22 PROCEDURE — 83036 HEMOGLOBIN GLYCOSYLATED A1C: CPT | Performed by: PHYSICIAN ASSISTANT

## 2021-01-22 ASSESSMENT — FIBROSIS 4 INDEX: FIB4 SCORE: 0.67

## 2021-01-22 ASSESSMENT — PATIENT HEALTH QUESTIONNAIRE - PHQ9: CLINICAL INTERPRETATION OF PHQ2 SCORE: 0

## 2021-01-23 NOTE — ASSESSMENT & PLAN NOTE
Chronic condition for this patient.  Currently taking glipizide 10 mg daily Metformin 500 mg twice daily.  A1c in the office today is 6.6% which is a slight improvement from his last visit that was at 6.7%.  We have again discussed the importance of diet in managing his blood sugars.  Patient states he is trying.  We will follow-up in 6 months with routine fasting labs.  Patient will continue his medication at its current dose.

## 2021-01-23 NOTE — PROGRESS NOTES
Chief Complaint   Patient presents with   • Follow-Up       HISTORY OF PRESENT ILLNESS: Patient is a 60 y.o. male established patient who presents today to discuss the following issues:    Type 2 diabetes mellitus without complication, without long-term current use of insulin ([facility])  Chronic condition for this patient.  Currently taking glipizide 10 mg daily Metformin 500 mg twice daily.  A1c in the office today is 6.6% which is a slight improvement from his last visit that was at 6.7%.  We have again discussed the importance of diet in managing his blood sugars.  Patient states he is trying.  We will follow-up in 6 months with routine fasting labs.  Patient will continue his medication at its current dose.      Patient Active Problem List    Diagnosis Date Noted   • Abdominal aortic aneurysm without rupture (HCC) 08/13/2020   • Presence of other vascular implants and grafts 08/13/2020   • Rash 03/06/2020   • Obesity, Class I, BMI 30-34.9 03/06/2020   • History of AAA (abdominal aortic aneurysm) repair 07/26/2018   • Type 2 diabetes mellitus without complication, without long-term current use of insulin ([facility]) 07/26/2018   • Erectile dysfunction due to diseases classified elsewhere 07/26/2018   • Essential hypertension 04/25/2016   • Hyperlipidemia associated with type 2 diabetes mellitus ([facility]) 04/25/2016   • Nicotine dependence with current use 1969       Allergies:Patient has no known allergies.    Current Outpatient Medications   Medication Sig Dispense Refill   • terbinafine (LAMISIL) 250 MG Tab Take 1 Tab by mouth every day. 90 Tab 0   • lisinopril (PRINIVIL) 5 MG Tab Take 1 Tab by mouth every day. 30 Tab 5   • cholecalciferol (VITAMIN D3) 400 UNIT Tab Take 400 Units by mouth every day.     • triamcinolone acetonide (KENALOG) 0.1 % Cream Apply 1 Application to affected area(s) 2 times a day. 30 g 2   • simvastatin (ZOCOR) 20 MG Tab Take 1 Tab by mouth every evening. 90 Tab 3   • glipiZIDE SR (GLUCOTROL XL) 10 MG  TABLET SR 24 HR Take 1 Tab by mouth every day. 90 Tab 3   • metFORMIN ER (GLUCOPHAGE XR) 500 MG TABLET SR 24 HR Take 1 Tab by mouth 2 times a day. 180 Tab 3   • aspirin 81 MG tablet Take 81 mg by mouth every day.     • Multiple Vitamins-Minerals (CENTRUM SILVER ADULT 50+) Tab Take  by mouth.       No current facility-administered medications for this visit.        Office Visit on 01/22/2021   Component Date Value Ref Range Status   • Glycohemoglobin 01/22/2021 6.6* 0.0 - 5.6 % Final   • Internal Control Negative 01/22/2021 Negative   Final   • Internal Control Positive 01/22/2021 Positive   Final   Hospital Outpatient Visit on 08/14/2020   Component Date Value Ref Range Status   • Cholesterol,Tot 08/14/2020 120  100 - 199 mg/dL Final   • Triglycerides 08/14/2020 151* 0 - 149 mg/dL Final   • HDL 08/14/2020 34* >=40 mg/dL Final   • LDL 08/14/2020 56  <100 mg/dL Final   • 25-Hydroxy   Vitamin D 25 08/14/2020 58  30 - 100 ng/mL Final    Comment: Adult Ranges:   <20 ng/mL - Deficiency  20-29 ng/mL - Insufficiency   ng/mL - Sufficiency  Effective 3/18/2020, this electrochemiluminescence binding assay is  performed using Roche douglas e immunoassay analyzer.  The Elecsys Vitamin D  total II assay is intended for the quantitative determination of total 25  hydroxyvitamin D in human serum and plasma. This assay is to be used as an  aid in the assessment of vitamin D sufficiency in adults.     • Sodium 08/14/2020 136  135 - 145 mmol/L Final   • Potassium 08/14/2020 4.9  3.6 - 5.5 mmol/L Final   • Chloride 08/14/2020 100  96 - 112 mmol/L Final   • Co2 08/14/2020 23  20 - 33 mmol/L Final   • Anion Gap 08/14/2020 13.0  7.0 - 16.0 Final   • Glucose 08/14/2020 166* 65 - 99 mg/dL Final   • Bun 08/14/2020 16  8 - 22 mg/dL Final   • Creatinine 08/14/2020 0.85  0.50 - 1.40 mg/dL Final   • Calcium 08/14/2020 9.8  8.5 - 10.5 mg/dL Final   • AST(SGOT) 08/14/2020 14  12 - 45 U/L Final   • ALT(SGPT) 08/14/2020 20  2 - 50 U/L Final   •  Alkaline Phosphatase 08/14/2020 103* 30 - 99 U/L Final   • Total Bilirubin 08/14/2020 0.4  0.1 - 1.5 mg/dL Final   • Albumin 08/14/2020 4.6  3.2 - 4.9 g/dL Final   • Total Protein 08/14/2020 7.5  6.0 - 8.2 g/dL Final   • Globulin 08/14/2020 2.9  1.9 - 3.5 g/dL Final   • A-G Ratio 08/14/2020 1.6  g/dL Final   • WBC 08/14/2020 9.7  4.8 - 10.8 K/uL Final   • RBC 08/14/2020 5.61  4.70 - 6.10 M/uL Final   • Hemoglobin 08/14/2020 16.4  14.0 - 18.0 g/dL Final   • Hematocrit 08/14/2020 51.3  42.0 - 52.0 % Final   • MCV 08/14/2020 91.4  81.4 - 97.8 fL Final   • MCH 08/14/2020 29.2  27.0 - 33.0 pg Final   • MCHC 08/14/2020 32.0* 33.7 - 35.3 g/dL Final   • RDW 08/14/2020 47.4  35.9 - 50.0 fL Final   • Platelet Count 08/14/2020 272  164 - 446 K/uL Final   • MPV 08/14/2020 9.8  9.0 - 12.9 fL Final   • Neutrophils-Polys 08/14/2020 65.10  44.00 - 72.00 % Final   • Lymphocytes 08/14/2020 24.20  22.00 - 41.00 % Final   • Monocytes 08/14/2020 7.60  0.00 - 13.40 % Final   • Eosinophils 08/14/2020 2.30  0.00 - 6.90 % Final   • Basophils 08/14/2020 0.50  0.00 - 1.80 % Final   • Immature Granulocytes 08/14/2020 0.30  0.00 - 0.90 % Final   • Nucleated RBC 08/14/2020 0.00  /100 WBC Final   • Neutrophils (Absolute) 08/14/2020 6.28  1.82 - 7.42 K/uL Final    Includes immature neutrophils, if present.   • Lymphs (Absolute) 08/14/2020 2.34  1.00 - 4.80 K/uL Final   • Monos (Absolute) 08/14/2020 0.73  0.00 - 0.85 K/uL Final   • Eos (Absolute) 08/14/2020 0.22  0.00 - 0.51 K/uL Final   • Baso (Absolute) 08/14/2020 0.05  0.00 - 0.12 K/uL Final   • Immature Granulocytes (abs) 08/14/2020 0.03  0.00 - 0.11 K/uL Final   • NRBC (Absolute) 08/14/2020 0.00  K/uL Final   • Creatinine, Urine 08/14/2020 116.85  mg/dL Final   • Microalbumin, Urine Random 08/14/2020 <1.2  mg/dL Final   • Micro Alb Creat Ratio 08/14/2020 see below  0 - 30 mg/g Final    Comment: Unable to calculate the microalbumin/creatinine ratio due to  the microalbumin result or the urine  creatinine result being  outside the measurement range of the analyzer.     • GFR If  2020 >60  >60 mL/min/1.73 m 2 Final   • GFR If Non  2020 >60  >60 mL/min/1.73 m 2 Final   Office Visit on 2020   Component Date Value Ref Range Status   • Glycohemoglobin 2020 6.7* 0.0 - 5.6 % Final   • Internal Control Negative 2020 Negative   Final   • Internal Control Positive 2020 Positive   Final   ]    The ASCVD Risk score (Kenzie MARCANO Jr., et al., 2013) failed to calculate for the following reasons:    The valid total cholesterol range is 130 to 320 mg/dL    Social History     Tobacco Use   • Smoking status: Current Every Day Smoker     Packs/day: 0.75     Types: Cigarettes     Start date: 1980   • Smokeless tobacco: Never Used   Substance Use Topics   • Alcohol use: No     Alcohol/week: 0.0 oz     Comment: special occasions 1-2 x year   • Drug use: No       Family Status   Relation Name Status   • Mo  Alive        br ca   • Fa          pr ca   • Bro  Alive   • Bro  Alive   • Desmond 2.14.82 Alive   • Son 3.18.87 Alive     Family History   Problem Relation Age of Onset   • Breast Cancer Mother    • Cancer Father    • Heart Disease Father        Health Maintenance Summary                COLON CANCER SCREENING ANNUAL FIT Overdue 2019      Done 2018 OCCULT BLOOD FECES IMMUNOASSAY     Patient has more history with this topic...    RETINAL SCREENING Overdue 2020      Done 2019      Patient has more history with this topic...    HEPATITIS C SCREENING Postponed 2021 Originally 1962. Patient Refused    IMM HEP B VACCINE Postponed 2021 Originally 8/10/1981. Patient Refused    IMM ZOSTER VACCINES Postponed 2021 Originally 8/10/2012. Patient Refused    DIABETES MONOFILAMENT / LE EXAM Next Due 3/6/2021      Done 3/6/2020      Patient has more history with this topic...    A1C SCREENING Next Due 2021      Done 2021  "POCT A1C     Patient has more history with this topic...    FASTING LIPID PROFILE Next Due 8/14/2021      Done 8/14/2020 LIPID PROFILE     Patient has more history with this topic...    URINE ACR / MICROALBUMIN Next Due 8/14/2021      Done 8/14/2020 MICROALBUMIN CREAT RATIO URINE     Patient has more history with this topic...    SERUM CREATININE Next Due 8/14/2021      Done 8/14/2020 COMP METABOLIC PANEL     Patient has more history with this topic...    IMM DTaP/Tdap/Td Vaccine Next Due 10/25/2028      Done 10/25/2018 Imm Admin: Tdap Vaccine           Review of Systems:   Constitutional: Negative for fever, chills, weight change, fatigue, loss of appetite.  HNT: Negative for nosebleeds, congestion, odynophagia, sore throat or changes in taste.    Eyes: Negative for vision changes.   Ears: Negative for recent changes in hearing, pain or discharge.  Neck: Negative for pain, swelling, lumps or goiter.  Respiratory: Negative for cough, sputum production, shortness of breath and wheezing.    Cardiovascular: Negative for chest pain, palpitations, orthopnea and leg swelling.   Gastrointestinal: Negative for constipation, diarrhea, heartburn, dysphagia, nausea, vomiting or abdominal pain.   Genitourinary: Negative for dysuria, urgency and frequency.   Musculoskeletal: Negative for myalgias, joint pain, and back pain.  Skin: Negative for skin, hair or nail changes, rash, itching.   Neurological: Negative for dizziness, tingling, tremors, sensory change, gait/coordination changes, focal weakness and headaches.   Endo/Heme/Allergies: Does not bruise/bleed easily.   Psychiatric/Behavioral: Negative for depression, suicidal ideas and memory loss.  The patient is not nervous/anxious and does not have insomnia.        Exam:  /80 (BP Location: Right arm, Patient Position: Sitting, BP Cuff Size: Adult)   Pulse 67   Temp 36.7 °C (98 °F) (Temporal)   Resp 18   Ht 1.803 m (5' 11\")   Wt 99.8 kg (220 lb)   SpO2 95%  Body " mass index is 30.68 kg/m².  General:  Well nourished, overweight, well developed male. No apparent distress.  Eyes: EOM intact, PERRL, conjunctiva non-injected, sclera non-icteric.  Ears: Briseida pinnae, external auditory canals, TM pearly gray with normal light reflex bilaterally.  Neck: Supple with no cervical lymphadenopathy, JVD, palpable thyroid nodules or carotid bruits.  Pulmonary: Clear to ausculation bilaterally. Normal effort. No rales, ronchi, or wheezing.  Cardiovascular: Regular rate and rhythm without murmur, rub or gallop.   Extremities: Full range of motion. Warm and well perfused with no edema.  Skin: Intact with no obvious rashes or lesions.  Neuro: Cranial nerves I-XII intact.  Psych: Alert and oriented x 3.  Appropriately dressed. Mood and affect appropriate.      Assessment/Plan:  1. Type 2 diabetes mellitus without complication, without long-term current use of insulin (HCC)  POCT Hemoglobin A1C       Reviewed risks and benefits of treatment plan. Patient verbally agrees to plan of care.     Return in about 6 months (around 7/22/2021).    Please note that this dictation was created using voice recognition software. I have made every reasonable attempt to correct obvious errors, but I expect that there are errors of grammar and possibly content that I did not discover before finalizing the note.

## 2021-02-23 RX ORDER — LISINOPRIL 5 MG/1
TABLET ORAL
Qty: 90 TABLET | Refills: 1 | Status: SHIPPED | OUTPATIENT
Start: 2021-02-23 | End: 2021-08-19

## 2021-08-19 RX ORDER — LISINOPRIL 5 MG/1
TABLET ORAL
Qty: 90 TABLET | Refills: 1 | Status: SHIPPED | OUTPATIENT
Start: 2021-08-19 | End: 2022-01-26 | Stop reason: SDUPTHER

## 2021-08-25 DIAGNOSIS — E78.5 HYPERLIPIDEMIA ASSOCIATED WITH TYPE 2 DIABETES MELLITUS (HCC): ICD-10-CM

## 2021-08-25 DIAGNOSIS — E11.69 HYPERLIPIDEMIA ASSOCIATED WITH TYPE 2 DIABETES MELLITUS (HCC): ICD-10-CM

## 2021-08-25 DIAGNOSIS — E11.9 TYPE 2 DIABETES MELLITUS WITHOUT COMPLICATION, WITHOUT LONG-TERM CURRENT USE OF INSULIN (HCC): ICD-10-CM

## 2021-08-25 DIAGNOSIS — L30.9 ECZEMA, UNSPECIFIED TYPE: ICD-10-CM

## 2021-08-25 NOTE — TELEPHONE ENCOUNTER
Was the patient seen in the last year in this department? Yes    Does patient have an active prescription for medications requested? Yes    Received Request Via: Pharmacy    Office Visit on 01/22/2021   Component Date Value   • Glycohemoglobin 01/22/2021 6.6*   • Internal Control Negative 01/22/2021 Negative    • Internal Control Positive 01/22/2021 Positive    ]

## 2021-08-26 DIAGNOSIS — E78.5 HYPERLIPIDEMIA ASSOCIATED WITH TYPE 2 DIABETES MELLITUS (HCC): ICD-10-CM

## 2021-08-26 DIAGNOSIS — E11.69 HYPERLIPIDEMIA ASSOCIATED WITH TYPE 2 DIABETES MELLITUS (HCC): ICD-10-CM

## 2021-08-26 DIAGNOSIS — E11.9 TYPE 2 DIABETES MELLITUS WITHOUT COMPLICATION, WITHOUT LONG-TERM CURRENT USE OF INSULIN (HCC): ICD-10-CM

## 2021-08-26 DIAGNOSIS — Z11.9 SCREENING EXAMINATION FOR INFECTIOUS DISEASE: ICD-10-CM

## 2021-08-26 RX ORDER — GLIPIZIDE 10 MG/1
TABLET, FILM COATED, EXTENDED RELEASE ORAL
Qty: 90 TABLET | Refills: 0 | Status: SHIPPED | OUTPATIENT
Start: 2021-08-26 | End: 2021-11-29

## 2021-08-26 RX ORDER — SIMVASTATIN 20 MG
TABLET ORAL
Qty: 90 TABLET | Refills: 0 | Status: SHIPPED | OUTPATIENT
Start: 2021-08-26 | End: 2021-11-29

## 2021-08-26 RX ORDER — METFORMIN HYDROCHLORIDE 500 MG/1
TABLET, EXTENDED RELEASE ORAL
Qty: 180 TABLET | Refills: 0 | Status: SHIPPED | OUTPATIENT
Start: 2021-08-26 | End: 2021-11-29

## 2021-08-26 RX ORDER — TRIAMCINOLONE ACETONIDE 1 MG/G
1 CREAM TOPICAL 2 TIMES DAILY
Qty: 30 G | Refills: 0 | Status: SHIPPED | OUTPATIENT
Start: 2021-08-26 | End: 2023-07-13 | Stop reason: SDUPTHER

## 2021-09-07 ENCOUNTER — NON-PROVIDER VISIT (OUTPATIENT)
Dept: MEDICAL GROUP | Facility: CLINIC | Age: 61
End: 2021-09-07
Payer: COMMERCIAL

## 2021-09-07 ENCOUNTER — HOSPITAL ENCOUNTER (OUTPATIENT)
Facility: MEDICAL CENTER | Age: 61
End: 2021-09-07
Attending: PHYSICIAN ASSISTANT
Payer: COMMERCIAL

## 2021-09-07 DIAGNOSIS — E11.69 HYPERLIPIDEMIA ASSOCIATED WITH TYPE 2 DIABETES MELLITUS (HCC): ICD-10-CM

## 2021-09-07 DIAGNOSIS — E78.5 HYPERLIPIDEMIA ASSOCIATED WITH TYPE 2 DIABETES MELLITUS (HCC): ICD-10-CM

## 2021-09-07 DIAGNOSIS — Z11.9 SCREENING EXAMINATION FOR INFECTIOUS DISEASE: ICD-10-CM

## 2021-09-07 DIAGNOSIS — E11.9 TYPE 2 DIABETES MELLITUS WITHOUT COMPLICATION, WITHOUT LONG-TERM CURRENT USE OF INSULIN (HCC): ICD-10-CM

## 2021-09-07 PROCEDURE — 36415 COLL VENOUS BLD VENIPUNCTURE: CPT | Performed by: PHYSICIAN ASSISTANT

## 2021-09-07 PROCEDURE — 83036 HEMOGLOBIN GLYCOSYLATED A1C: CPT

## 2021-09-07 PROCEDURE — 80053 COMPREHEN METABOLIC PANEL: CPT

## 2021-09-07 PROCEDURE — 82570 ASSAY OF URINE CREATININE: CPT

## 2021-09-07 PROCEDURE — 82043 UR ALBUMIN QUANTITATIVE: CPT

## 2021-09-07 PROCEDURE — G0472 HEP C SCREEN HIGH RISK/OTHER: HCPCS

## 2021-09-07 PROCEDURE — 80061 LIPID PANEL: CPT

## 2021-09-08 LAB
ALBUMIN SERPL BCP-MCNC: 4.2 G/DL (ref 3.2–4.9)
ALBUMIN/GLOB SERPL: 1.4 G/DL
ALP SERPL-CCNC: 95 U/L (ref 30–99)
ALT SERPL-CCNC: 24 U/L (ref 2–50)
ANION GAP SERPL CALC-SCNC: 13 MMOL/L (ref 7–16)
AST SERPL-CCNC: 20 U/L (ref 12–45)
BILIRUB SERPL-MCNC: 0.4 MG/DL (ref 0.1–1.5)
BUN SERPL-MCNC: 16 MG/DL (ref 8–22)
CALCIUM SERPL-MCNC: 9.4 MG/DL (ref 8.5–10.5)
CHLORIDE SERPL-SCNC: 103 MMOL/L (ref 96–112)
CHOLEST SERPL-MCNC: 122 MG/DL (ref 100–199)
CO2 SERPL-SCNC: 21 MMOL/L (ref 20–33)
CREAT SERPL-MCNC: 0.77 MG/DL (ref 0.5–1.4)
CREAT UR-MCNC: 132.58 MG/DL
EST. AVERAGE GLUCOSE BLD GHB EST-MCNC: 183 MG/DL
GLOBULIN SER CALC-MCNC: 3 G/DL (ref 1.9–3.5)
GLUCOSE SERPL-MCNC: 196 MG/DL (ref 65–99)
HBA1C MFR BLD: 8 % (ref 4–5.6)
HCV AB SER QL: NORMAL
HDLC SERPL-MCNC: 36 MG/DL
LDLC SERPL CALC-MCNC: 49 MG/DL
MICROALBUMIN UR-MCNC: 1.7 MG/DL
MICROALBUMIN/CREAT UR: 13 MG/G (ref 0–30)
POTASSIUM SERPL-SCNC: 4.6 MMOL/L (ref 3.6–5.5)
PROT SERPL-MCNC: 7.2 G/DL (ref 6–8.2)
SODIUM SERPL-SCNC: 137 MMOL/L (ref 135–145)
TRIGL SERPL-MCNC: 184 MG/DL (ref 0–149)

## 2021-09-17 ENCOUNTER — OFFICE VISIT (OUTPATIENT)
Dept: MEDICAL GROUP | Facility: CLINIC | Age: 61
End: 2021-09-17
Payer: COMMERCIAL

## 2021-09-17 VITALS
RESPIRATION RATE: 16 BRPM | HEART RATE: 92 BPM | WEIGHT: 225 LBS | OXYGEN SATURATION: 91 % | TEMPERATURE: 98.4 F | DIASTOLIC BLOOD PRESSURE: 86 MMHG | SYSTOLIC BLOOD PRESSURE: 148 MMHG | BODY MASS INDEX: 30.48 KG/M2 | HEIGHT: 72 IN

## 2021-09-17 DIAGNOSIS — E11.9 TYPE 2 DIABETES MELLITUS WITHOUT COMPLICATION, WITHOUT LONG-TERM CURRENT USE OF INSULIN (HCC): ICD-10-CM

## 2021-09-17 DIAGNOSIS — Z12.12 SCREENING FOR COLORECTAL CANCER: ICD-10-CM

## 2021-09-17 DIAGNOSIS — E78.5 HYPERLIPIDEMIA ASSOCIATED WITH TYPE 2 DIABETES MELLITUS (HCC): ICD-10-CM

## 2021-09-17 DIAGNOSIS — Z12.11 SCREENING FOR COLORECTAL CANCER: ICD-10-CM

## 2021-09-17 DIAGNOSIS — E11.69 HYPERLIPIDEMIA ASSOCIATED WITH TYPE 2 DIABETES MELLITUS (HCC): ICD-10-CM

## 2021-09-17 DIAGNOSIS — I71.40 ABDOMINAL AORTIC ANEURYSM WITHOUT RUPTURE (HCC): ICD-10-CM

## 2021-09-17 PROCEDURE — 99214 OFFICE O/P EST MOD 30 MIN: CPT | Performed by: PHYSICIAN ASSISTANT

## 2021-09-17 ASSESSMENT — FIBROSIS 4 INDEX: FIB4 SCORE: 0.89

## 2021-09-22 PROBLEM — E78.5 HYPERLIPIDEMIA: Status: RESOLVED | Noted: 2021-07-01 | Resolved: 2021-09-22

## 2021-09-23 ASSESSMENT — ENCOUNTER SYMPTOMS
MUSCULOSKELETAL NEGATIVE: 1
CONSTITUTIONAL NEGATIVE: 1
CARDIOVASCULAR NEGATIVE: 1
NEUROLOGICAL NEGATIVE: 1
EYES NEGATIVE: 1
GASTROINTESTINAL NEGATIVE: 1
RESPIRATORY NEGATIVE: 1
PSYCHIATRIC NEGATIVE: 1

## 2021-09-23 ASSESSMENT — VISUAL ACUITY: OU: 1

## 2021-09-23 NOTE — PROGRESS NOTES
Subjective   Quirino Barker is a 61 y.o. male who presents with Diabetes (Elevated A1c )    1. Type 2 diabetes mellitus without complication, without long-term current use of insulin (HCC)  Currently treated for DM, taking meds and checking blood sugars at home, trying to do DM diet. Currently uncontrolled. A1c increased from 8 months ago from 6.6% to 8.0%. He admits to having more carbs in his diet than he should.He will try to do better with following his diet. Currently on metformin  twice daily and glipizide SR 10 mg daily. At recheck if levels remain elevated will increase metformin.    2. Hyperlipidemia associated with type 2 diabetes mellitus (HCC)  Total cholesterol looks very good but triglycerides have gone up. Currently total cholesterol 122, triglycerides 184, HDL 36, LDL 49. Will continue to take simvastatin as prescribed.States he will start cutting back on carbs and increasing his physical activity.    3. Abdominal aortic aneurysm without rupture (HCC)  Recently had his abdominal ultrasound for monitoring. Was told that the US looked good and he would repeat next year.    4. Screening for colorectal cancer  Dur for annual screening.  - OCCULT BLOOD FECES IMMUNOASSAY (FIT); Future    Past Medical History:  No date: Diabetes (HCC)  No date: ED (erectile dysfunction)  No date: Hyperlipidemia  No date: Hypertension  Past Surgical History:  No date: OTHER      Comment:  AAA repair incl. iliacs  Social History    Tobacco Use      Smoking status: Current Every Day Smoker        Packs/day: 0.75        Types: Cigarettes        Start date: 8/13/1980      Smokeless tobacco: Never Used    Vaping Use      Vaping Use: Never used    Alcohol use: No      Alcohol/week: 0.0 oz      Comment: special occasions 1-2 x year    Drug use: No    Review of patient's family history indicates:  Problem: Breast Cancer      Relation: Mother          Age of Onset: (Not Specified)  Problem: Cancer      Relation: Father           Age of Onset: (Not Specified)  Problem: Heart Disease      Relation: Father          Age of Onset: (Not Specified)      Current Outpatient Medications: •  glipiZIDE SR (GLUCOTROL) 10 MG TABLET SR 24 HR, TAKE 1 TABLET BY MOUTH EVERY DAY, Disp: 90 Tablet, Rfl: 0•  simvastatin (ZOCOR) 20 MG Tab, TAKE 1 TABLET BY MOUTH EVERY DAY IN THE EVENING, Disp: 90 Tablet, Rfl: 0•  triamcinolone acetonide (KENALOG) 0.1 % Cream, APPLY 1 APPLICATION TO AFFECTED AREA(S) 2 TIMES A DAY., Disp: 30 g, Rfl: 0•  metFORMIN ER (GLUCOPHAGE XR) 500 MG TABLET SR 24 HR, TAKE 1 TABLET BY MOUTH TWICE A DAY, Disp: 180 Tablet, Rfl: 0•  lisinopril (PRINIVIL) 5 MG Tab, TAKE 1 TABLET BY MOUTH EVERY DAY, Disp: 90 Tablet, Rfl: 1•  terbinafine (LAMISIL) 250 MG Tab, Take 1 Tab by mouth every day., Disp: 90 Tab, Rfl: 0•  cholecalciferol (VITAMIN D3) 400 UNIT Tab, Take 400 Units by mouth every day., Disp: , Rfl: •  aspirin 81 MG tablet, Take 81 mg by mouth every day., Disp: , Rfl: •  Multiple Vitamins-Minerals (CENTRUM SILVER ADULT 50+) Tab, Take  by mouth., Disp: , Rfl:     Patient was instructed on the use of medications, either prescriptions or OTC and informed on when the appropriate follow up time period should be. In addition, patient was also instructed that should any acute worsening occur that they should notify this clinic asap or call 911.      Review of Systems   Constitutional: Negative.    HENT: Negative.    Eyes: Negative.    Respiratory: Negative.    Cardiovascular: Negative.    Gastrointestinal: Negative.    Genitourinary: Negative.    Musculoskeletal: Negative.    Skin: Negative.    Neurological: Negative.    Endo/Heme/Allergies: Negative.    Psychiatric/Behavioral: Negative.      Objective     /86 (BP Location: Left arm, Patient Position: Sitting, BP Cuff Size: Adult)   Pulse 92   Temp 36.9 °C (98.4 °F) (Temporal)   Resp 16   Ht 1.829 m (6')   Wt 102 kg (225 lb) Comment: with shoes  SpO2 91%   BMI 30.52 kg/m²      Physical  Exam  Vitals and nursing note reviewed.   Constitutional:       Appearance: Normal appearance. He is well-developed and well-groomed.   HENT:      Head: Normocephalic and atraumatic.      Nose: Nose normal.      Mouth/Throat:      Lips: Pink. No lesions.      Mouth: Mucous membranes are moist.   Eyes:      General: Lids are normal. Vision grossly intact. Gaze aligned appropriately.      Extraocular Movements: Extraocular movements intact.      Conjunctiva/sclera: Conjunctivae normal.      Pupils: Pupils are equal, round, and reactive to light.   Neck:      Thyroid: No thyromegaly.      Vascular: No carotid bruit or JVD.      Trachea: Trachea and phonation normal.   Cardiovascular:      Rate and Rhythm: Normal rate and regular rhythm.      Heart sounds: Normal heart sounds. No murmur heard.   No friction rub. No gallop.    Pulmonary:      Effort: Pulmonary effort is normal.      Breath sounds: Normal breath sounds. No wheezing, rhonchi or rales.   Musculoskeletal:         General: Normal range of motion.      Cervical back: Normal range of motion and neck supple.      Right lower leg: No edema.      Left lower leg: No edema.   Lymphadenopathy:      Cervical: No cervical adenopathy.   Skin:     General: Skin is warm and dry.      Capillary Refill: Capillary refill takes less than 2 seconds.      Findings: No lesion or rash.   Neurological:      Mental Status: He is alert and oriented to person, place, and time.      Cranial Nerves: Cranial nerves are intact.   Psychiatric:         Attention and Perception: Attention and perception normal.         Mood and Affect: Mood and affect normal.         Speech: Speech normal.         Behavior: Behavior normal. Behavior is cooperative.         Thought Content: Thought content normal.         Judgment: Judgment normal.       Assessment & Plan      1. Type 2 diabetes mellitus without complication, without long-term current use of insulin (HCC)    2. Hyperlipidemia associated with  type 2 diabetes mellitus (HCC)    3. Abdominal aortic aneurysm without rupture (HCC)    4. Screening for colorectal cancer  - OCCULT BLOOD FECES IMMUNOASSAY (FIT); Future

## 2021-10-26 ENCOUNTER — APPOINTMENT (OUTPATIENT)
Dept: MEDICAL GROUP | Facility: CLINIC | Age: 61
End: 2021-10-26
Payer: COMMERCIAL

## 2021-10-26 DIAGNOSIS — E11.9 TYPE 2 DIABETES MELLITUS WITHOUT COMPLICATION, WITHOUT LONG-TERM CURRENT USE OF INSULIN (HCC): ICD-10-CM

## 2021-10-26 LAB
HBA1C MFR BLD: 7.5 % (ref 0–5.6)
INT CON NEG: NEGATIVE
INT CON POS: POSITIVE

## 2021-10-26 PROCEDURE — 83036 HEMOGLOBIN GLYCOSYLATED A1C: CPT | Performed by: PHYSICIAN ASSISTANT

## 2021-10-26 NOTE — PROGRESS NOTES
Here today for recheck of his A1c. He does not want to be seen in office today he just wants the test done and a MyChart message with results. He could not get the time off today for the appointment.

## 2021-11-28 DIAGNOSIS — E78.5 HYPERLIPIDEMIA ASSOCIATED WITH TYPE 2 DIABETES MELLITUS (HCC): ICD-10-CM

## 2021-11-28 DIAGNOSIS — E11.9 TYPE 2 DIABETES MELLITUS WITHOUT COMPLICATION, WITHOUT LONG-TERM CURRENT USE OF INSULIN (HCC): ICD-10-CM

## 2021-11-28 DIAGNOSIS — E11.69 HYPERLIPIDEMIA ASSOCIATED WITH TYPE 2 DIABETES MELLITUS (HCC): ICD-10-CM

## 2021-11-29 RX ORDER — GLIPIZIDE 10 MG/1
TABLET, FILM COATED, EXTENDED RELEASE ORAL
Qty: 90 TABLET | Refills: 0 | Status: SHIPPED | OUTPATIENT
Start: 2021-11-29 | End: 2022-01-26 | Stop reason: SDUPTHER

## 2021-11-29 RX ORDER — METFORMIN HYDROCHLORIDE 500 MG/1
TABLET, EXTENDED RELEASE ORAL
Qty: 180 TABLET | Refills: 0 | Status: SHIPPED | OUTPATIENT
Start: 2021-11-29 | End: 2022-01-26

## 2021-11-29 RX ORDER — SIMVASTATIN 20 MG
TABLET ORAL
Qty: 90 TABLET | Refills: 0 | Status: SHIPPED | OUTPATIENT
Start: 2021-11-29 | End: 2022-01-26 | Stop reason: SDUPTHER

## 2021-11-29 NOTE — TELEPHONE ENCOUNTER
Was the patient seen in the last year in this department? Yes    Does patient have an active prescription for medications requested? Yes    Received Request Via: Pharmacy    Orders Only on 10/26/2021   Component Date Value   • Glycohemoglobin 10/26/2021 7.5*   • Internal Control Negative 10/26/2021 Negative    • Internal Control Positive 10/26/2021 Positive    Hospital Outpatient Visit on 09/07/2021   Component Date Value   • Creatinine, Urine 09/07/2021 132.58    • Microalbumin, Urine Rand* 09/07/2021 1.7    • Micro Alb Creat Ratio 09/07/2021 13    • Cholesterol,Tot 09/07/2021 122    • Triglycerides 09/07/2021 184*   • HDL 09/07/2021 36*   • LDL 09/07/2021 49    • Glycohemoglobin 09/07/2021 8.0*   • Est Avg Glucose 09/07/2021 183    • Hepatitis C Antibody 09/07/2021 Non-Reactive    • Sodium 09/07/2021 137    • Potassium 09/07/2021 4.6    • Chloride 09/07/2021 103    • Co2 09/07/2021 21    • Anion Gap 09/07/2021 13.0    • Glucose 09/07/2021 196*   • Bun 09/07/2021 16    • Creatinine 09/07/2021 0.77    • Calcium 09/07/2021 9.4    • AST(SGOT) 09/07/2021 20    • ALT(SGPT) 09/07/2021 24    • Alkaline Phosphatase 09/07/2021 95    • Total Bilirubin 09/07/2021 0.4    • Albumin 09/07/2021 4.2    • Total Protein 09/07/2021 7.2    • Globulin 09/07/2021 3.0    • A-G Ratio 09/07/2021 1.4    • GFR If  09/07/2021 >60    • GFR If Non  Ameri* 09/07/2021 >60    Office Visit on 01/22/2021   Component Date Value   • Glycohemoglobin 01/22/2021 6.6*   • Internal Control Negative 01/22/2021 Negative    • Internal Control Positive 01/22/2021 Positive    ]

## 2021-12-12 ENCOUNTER — OFFICE VISIT (OUTPATIENT)
Dept: URGENT CARE | Facility: PHYSICIAN GROUP | Age: 61
End: 2021-12-12
Payer: COMMERCIAL

## 2021-12-12 VITALS
HEART RATE: 84 BPM | HEIGHT: 72 IN | WEIGHT: 221.4 LBS | BODY MASS INDEX: 29.99 KG/M2 | OXYGEN SATURATION: 98 % | SYSTOLIC BLOOD PRESSURE: 130 MMHG | TEMPERATURE: 97.6 F | RESPIRATION RATE: 12 BRPM | DIASTOLIC BLOOD PRESSURE: 78 MMHG

## 2021-12-12 DIAGNOSIS — S00.81XA ABRASION OF FOREHEAD, INITIAL ENCOUNTER: ICD-10-CM

## 2021-12-12 DIAGNOSIS — H11.32 SUBCONJUNCTIVAL HEMORRHAGE OF LEFT EYE: ICD-10-CM
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
## 2021-12-12 DIAGNOSIS — S05.8X2A: ICD-10-CM

## 2021-12-12 PROCEDURE — 99213 OFFICE O/P EST LOW 20 MIN: CPT | Performed by: PHYSICIAN ASSISTANT

## 2021-12-12 RX ORDER — CEPHALEXIN 500 MG/1
500 CAPSULE ORAL 4 TIMES DAILY
Qty: 20 CAPSULE | Refills: 0 | Status: SHIPPED | OUTPATIENT
Start: 2021-12-12 | End: 2021-12-17

## 2021-12-12 ASSESSMENT — FIBROSIS 4 INDEX: FIB4 SCORE: 0.89

## 2021-12-12 NOTE — PROGRESS NOTES
Chief Complaint   Patient presents with   • Head Laceration     tripped and fell into a mytar saw at home   • Facial Swelling       HISTORY OF PRESENT ILLNESS: Patient is a 61 y.o. male who presents today for the following:    Ground-level fall 1 hour prior to arrival  Struck his face against a miter saw on his way down  Denies LOC  Denies ocular pain and vision changes  Takes 81 mg aspirin, otherwise is not on any blood thinners  Denies headache, dizziness, vision changes, nausea    Patient Active Problem List    Diagnosis Date Noted   • Abdominal aortic aneurysm without rupture (HCC) 08/13/2020   • Presence of other vascular implants and grafts 08/13/2020   • Rash 03/06/2020   • Obesity, Class I, BMI 30-34.9 03/06/2020   • History of AAA (abdominal aortic aneurysm) repair 07/26/2018   • Type 2 diabetes mellitus without complication, without long-term current use of insulin ([facility]) 07/26/2018   • Erectile dysfunction due to diseases classified elsewhere 07/26/2018   • Essential hypertension 04/25/2016   • Hyperlipidemia associated with type 2 diabetes mellitus ([facility]) 04/25/2016   • Nicotine dependence with current use 1969       Allergies:Patient has no known allergies.    Current Outpatient Medications Ordered in Epic   Medication Sig Dispense Refill   • cephALEXin (KEFLEX) 500 MG Cap Take 1 Capsule by mouth 4 times a day for 5 days. 20 Capsule 0   • simvastatin (ZOCOR) 20 MG Tab TAKE 1 TABLET BY MOUTH EVERY DAY IN THE EVENING 90 Tablet 0   • metFORMIN ER (GLUCOPHAGE XR) 500 MG TABLET SR 24 HR TAKE 1 TABLET BY MOUTH TWICE A  Tablet 0   • glipiZIDE SR (GLUCOTROL) 10 MG TABLET SR 24 HR TAKE 1 TABLET BY MOUTH EVERY DAY 90 Tablet 0   • triamcinolone acetonide (KENALOG) 0.1 % Cream APPLY 1 APPLICATION TO AFFECTED AREA(S) 2 TIMES A DAY. 30 g 0   • lisinopril (PRINIVIL) 5 MG Tab TAKE 1 TABLET BY MOUTH EVERY DAY 90 Tablet 1   • terbinafine (LAMISIL) 250 MG Tab Take 1 Tab by mouth every day. 90 Tab 0   •  cholecalciferol (VITAMIN D3) 400 UNIT Tab Take 400 Units by mouth every day.     • aspirin 81 MG tablet Take 81 mg by mouth every day.     • Multiple Vitamins-Minerals (CENTRUM SILVER ADULT 50+) Tab Take  by mouth.       No current Epic-ordered facility-administered medications on file.       Past Medical History:   Diagnosis Date   • Diabetes (HCC)    • ED (erectile dysfunction)    • Hyperlipidemia    • Hypertension        Social History     Tobacco Use   • Smoking status: Current Every Day Smoker     Packs/day: 0.75     Types: Cigarettes     Start date: 1980   • Smokeless tobacco: Never Used   Vaping Use   • Vaping Use: Never used   Substance Use Topics   • Alcohol use: No     Alcohol/week: 0.0 oz     Comment: special occasions 1-2 x year   • Drug use: No       Family Status   Relation Name Status   • Mo  Alive        br ca   • Fa          pr ca   • Bro  Alive   • Bro  Alive   • Desmond 82 Alive   • Son 3.18.87 Alive     Family History   Problem Relation Age of Onset   • Breast Cancer Mother    • Cancer Father    • Heart Disease Father        Review of Systems:    Constitutional ROS: No unexpected change in weight, No weakness, No fatigue  Pulmonary ROS: No chronic cough, sputum, or hemoptysis, No dyspnea on exertion, No wheezing  Cardiovascular ROS: No diaphoresis, No edema, No palpitations  Hematologic/Lymphatic ROS: No chills, No night sweats, No weight loss  Skin/Integumentary ROS: Facial abrasion      Exam:  /78   Pulse 84   Temp 36.4 °C (97.6 °F)   Resp 12   Ht 1.829 m (6')   Wt 100 kg (221 lb 6.4 oz)   SpO2 98%   General: Well developed, well nourished. No distress.    HENT: Subconjunctival hemorrhage noted in the left eye, primarily lateral and inferior aspect.  Soft tissue swelling is noted inferior to the left eye with a small linear abrasion.  No active bleeding is noted.  Edges are approximated.  Abrasion noted between the eyebrows.  No active bleeding noted.   PERRL/EOMI.  Pulmonary: Unlabored respiratory effort.   Neurologic: Grossly nonfocal. No facial asymmetry noted.  Skin: Warm, dry, good turgor. No rashes in visible areas.   Psych: Normal mood. Alert and oriented to person, place and time.    Assessment/Plan:  Low suspicion for ICH.  Discussed wound care at home.  No lacerations noted.  Discussed appropriate over-the-counter symptomatic medication, and when to return to clinic. Follow up for worsening or persistent symptoms.  1. Abrasion of forehead, initial encounter  cephALEXin (KEFLEX) 500 MG Cap   2. Abrasion of left eye region  cephALEXin (KEFLEX) 500 MG Cap   3. Subconjunctival hemorrhage of left eye     4. Fall from ground level

## 2022-01-26 ENCOUNTER — OFFICE VISIT (OUTPATIENT)
Dept: MEDICAL GROUP | Facility: CLINIC | Age: 62
End: 2022-01-26
Payer: COMMERCIAL

## 2022-01-26 VITALS
WEIGHT: 218.4 LBS | HEIGHT: 72 IN | DIASTOLIC BLOOD PRESSURE: 70 MMHG | HEART RATE: 81 BPM | SYSTOLIC BLOOD PRESSURE: 124 MMHG | TEMPERATURE: 97.5 F | BODY MASS INDEX: 29.58 KG/M2 | RESPIRATION RATE: 16 BRPM | OXYGEN SATURATION: 97 %

## 2022-01-26 DIAGNOSIS — E78.5 HYPERLIPIDEMIA ASSOCIATED WITH TYPE 2 DIABETES MELLITUS (HCC): ICD-10-CM

## 2022-01-26 DIAGNOSIS — I10 ESSENTIAL HYPERTENSION: ICD-10-CM

## 2022-01-26 DIAGNOSIS — E11.9 TYPE 2 DIABETES MELLITUS WITHOUT COMPLICATION, WITHOUT LONG-TERM CURRENT USE OF INSULIN (HCC): ICD-10-CM

## 2022-01-26 DIAGNOSIS — E11.69 HYPERLIPIDEMIA ASSOCIATED WITH TYPE 2 DIABETES MELLITUS (HCC): ICD-10-CM

## 2022-01-26 LAB
HBA1C MFR BLD: 7.7 % (ref 0–5.6)
INT CON NEG: NEGATIVE
INT CON POS: POSITIVE

## 2022-01-26 PROCEDURE — 99214 OFFICE O/P EST MOD 30 MIN: CPT | Performed by: PHYSICIAN ASSISTANT

## 2022-01-26 PROCEDURE — 83036 HEMOGLOBIN GLYCOSYLATED A1C: CPT | Performed by: PHYSICIAN ASSISTANT

## 2022-01-26 RX ORDER — GLIPIZIDE 10 MG/1
10 TABLET, FILM COATED, EXTENDED RELEASE ORAL
Qty: 90 TABLET | Refills: 1 | Status: SHIPPED | OUTPATIENT
Start: 2022-01-26 | End: 2022-09-04

## 2022-01-26 RX ORDER — METFORMIN HYDROCHLORIDE 500 MG/1
1000 TABLET, EXTENDED RELEASE ORAL 2 TIMES DAILY
Qty: 360 TABLET | Refills: 1 | Status: SHIPPED | OUTPATIENT
Start: 2022-01-26 | End: 2022-08-04

## 2022-01-26 RX ORDER — LISINOPRIL 5 MG/1
5 TABLET ORAL
Qty: 90 TABLET | Refills: 1 | Status: SHIPPED | OUTPATIENT
Start: 2022-01-26 | End: 2022-07-27

## 2022-01-26 RX ORDER — SIMVASTATIN 20 MG
20 TABLET ORAL EVERY EVENING
Qty: 90 TABLET | Refills: 1 | Status: SHIPPED | OUTPATIENT
Start: 2022-01-26 | End: 2022-09-04

## 2022-01-26 ASSESSMENT — ENCOUNTER SYMPTOMS
EYES NEGATIVE: 1
MUSCULOSKELETAL NEGATIVE: 1
NEUROLOGICAL NEGATIVE: 1
PSYCHIATRIC NEGATIVE: 1
CONSTITUTIONAL NEGATIVE: 1
CARDIOVASCULAR NEGATIVE: 1
RESPIRATORY NEGATIVE: 1
GASTROINTESTINAL NEGATIVE: 1

## 2022-01-26 ASSESSMENT — VISUAL ACUITY: OU: 1

## 2022-01-26 ASSESSMENT — FIBROSIS 4 INDEX: FIB4 SCORE: 0.89

## 2022-01-26 ASSESSMENT — PATIENT HEALTH QUESTIONNAIRE - PHQ9: CLINICAL INTERPRETATION OF PHQ2 SCORE: 0

## 2022-01-26 NOTE — PROGRESS NOTES
Subjective   Quirino Barker is a 61 y.o. male who presents with Diabetes Follow-up    1. Type 2 diabetes mellitus without complication, without long-term current use of insulin (HCC)  Chronic condition for this patient.  Patient does not follow a diabetic diet.  Admits that his diet is carb heavy.  A1c in office today is 7.7% this is an increase from 3 months ago when he was at 7.5%.  Patient states that he is unlikely to change his diet very much.  We will increase his Metformin to 1000 mg twice daily and he will continue on his glipizide daily.  We will recheck labs in 3 months and consider adding medication at that time.   POCT Hemoglobin A1C   Diabetic Monofilament Lower Extremity Exam   metFORMIN ER (GLUCOPHAGE XR) 500 MG TABLET SR 24 HR; Take 2 Tablets by mouth 2 times a day.  Dispense: 360 Tablet; Refill: 1   glipiZIDE SR (GLUCOTROL) 10 MG TABLET SR 24 HR; Take 1 Tablet by mouth every day.  Dispense: 90 Tablet; Refill: 1   HEMOGLOBIN A1C; Future    2. Hyperlipidemia associated with type 2 diabetes mellitus (HCC)  Chronic condition for this patient.  Last lipid panel has total cholesterol and LDL within the normal range and an elevated triglyceride level.  We have discussed diet modifications to help control these numbers.  He is taking his simvastatin daily as directed.  He denies missing doses, chest pain, muscle pain.  Requesting refills today   simvastatin (ZOCOR) 20 MG Tab; Take 1 Tablet by mouth every evening.  Dispense: 90 Tablet; Refill: 1   Lipid Profile; Future    3. Essential hypertension  Currently treated for hypertension, taking medications as prescribed.  Denies chest pain or shortness of breath.  Blood pressure in office today is 124/70.  Requesting refill of his lisinopril.    Controlled   lisinopril (PRINIVIL) 5 MG Tab; Take 1 Tablet by mouth every day.  Dispense: 90 Tablet; Refill: 1    Past Medical History:  No date: Diabetes (HCC)  No date: ED (erectile dysfunction)  No date:  Hyperlipidemia  No date: Hypertension  Past Surgical History:  No date: OTHER      Comment:  AAA repair incl. iliacs  Social History    Tobacco Use      Smoking status: Current Every Day Smoker        Packs/day: 0.75        Types: Cigarettes        Start date: 8/13/1980      Smokeless tobacco: Never Used    Vaping Use      Vaping Use: Never used    Alcohol use: No      Alcohol/week: 0.0 oz      Comment: special occasions 1-2 x year    Drug use: No    Review of patient's family history indicates:  Problem: Breast Cancer      Relation: Mother          Age of Onset: (Not Specified)  Problem: Cancer      Relation: Father          Age of Onset: (Not Specified)  Problem: Heart Disease      Relation: Father          Age of Onset: (Not Specified)      Current Outpatient Medications: •  lisinopril (PRINIVIL) 5 MG Tab, Take 1 Tablet by mouth every day., Disp: 90 Tablet, Rfl: 1•  metFORMIN ER (GLUCOPHAGE XR) 500 MG TABLET SR 24 HR, Take 2 Tablets by mouth 2 times a day., Disp: 360 Tablet, Rfl: 1•  simvastatin (ZOCOR) 20 MG Tab, Take 1 Tablet by mouth every evening., Disp: 90 Tablet, Rfl: 1•  glipiZIDE SR (GLUCOTROL) 10 MG TABLET SR 24 HR, Take 1 Tablet by mouth every day., Disp: 90 Tablet, Rfl: 1•  triamcinolone acetonide (KENALOG) 0.1 % Cream, APPLY 1 APPLICATION TO AFFECTED AREA(S) 2 TIMES A DAY., Disp: 30 g, Rfl: 0•  cholecalciferol (VITAMIN D3) 400 UNIT Tab, Take 400 Units by mouth every day., Disp: , Rfl: •  aspirin 81 MG tablet, Take 81 mg by mouth every day., Disp: , Rfl: •  Multiple Vitamins-Minerals (CENTRUM SILVER ADULT 50+) Tab, Take  by mouth., Disp: , Rfl:     Patient was instructed on the use of medications, either prescriptions or OTC and informed on when the appropriate follow up time period should be. In addition, patient was also instructed that should any acute worsening occur that they should notify this clinic asap or call 911.      Review of Systems   Constitutional: Negative.    HENT: Negative.    Eyes:  Negative.    Respiratory: Negative.    Cardiovascular: Negative.    Gastrointestinal: Negative.    Genitourinary: Negative.    Musculoskeletal: Negative.    Skin: Negative.    Neurological: Negative.    Endo/Heme/Allergies: Negative.    Psychiatric/Behavioral: Negative.      Objective     /70 (BP Location: Right arm, Patient Position: Sitting, BP Cuff Size: Large adult)   Pulse 81   Temp 36.4 °C (97.5 °F) (Temporal)   Resp 16   Ht 1.829 m (6') Comment: stated by pt  Wt 99.1 kg (218 lb 6.4 oz) Comment: with shoes on  SpO2 97%   BMI 29.62 kg/m²      Physical Exam  Vitals and nursing note reviewed.   Constitutional:       Appearance: Normal appearance. He is well-developed and well-groomed.   HENT:      Head: Normocephalic and atraumatic.      Nose: Nose normal.      Mouth/Throat:      Lips: Pink. No lesions.      Mouth: Mucous membranes are moist.   Eyes:      General: Lids are normal. Vision grossly intact. Gaze aligned appropriately.      Extraocular Movements: Extraocular movements intact.      Conjunctiva/sclera: Conjunctivae normal.      Pupils: Pupils are equal, round, and reactive to light.   Neck:      Thyroid: No thyromegaly.      Vascular: No carotid bruit or JVD.      Trachea: Trachea and phonation normal.   Cardiovascular:      Rate and Rhythm: Normal rate and regular rhythm.      Heart sounds: Normal heart sounds. No murmur heard.  No friction rub. No gallop.    Pulmonary:      Effort: Pulmonary effort is normal.      Breath sounds: Normal breath sounds. No wheezing, rhonchi or rales.   Musculoskeletal:         General: Normal range of motion.      Cervical back: Normal range of motion and neck supple.      Right lower leg: No edema.      Left lower leg: No edema.   Lymphadenopathy:      Cervical: No cervical adenopathy.   Skin:     General: Skin is warm and dry.      Capillary Refill: Capillary refill takes less than 2 seconds.      Findings: No lesion or rash.   Neurological:      Mental  Status: He is alert and oriented to person, place, and time.      Cranial Nerves: Cranial nerves are intact.   Psychiatric:         Attention and Perception: Attention and perception normal.         Mood and Affect: Mood and affect normal.         Speech: Speech normal.         Behavior: Behavior normal. Behavior is cooperative.         Thought Content: Thought content normal.         Judgment: Judgment normal.       Assessment & Plan      1. Type 2 diabetes mellitus without complication, without long-term current use of insulin (HCC)  - POCT Hemoglobin A1C  - Diabetic Monofilament Lower Extremity Exam  - metFORMIN ER (GLUCOPHAGE XR) 500 MG TABLET SR 24 HR; Take 2 Tablets by mouth 2 times a day.  Dispense: 360 Tablet; Refill: 1  - glipiZIDE SR (GLUCOTROL) 10 MG TABLET SR 24 HR; Take 1 Tablet by mouth every day.  Dispense: 90 Tablet; Refill: 1  - HEMOGLOBIN A1C; Future    2. Hyperlipidemia associated with type 2 diabetes mellitus (HCC)  - simvastatin (ZOCOR) 20 MG Tab; Take 1 Tablet by mouth every evening.  Dispense: 90 Tablet; Refill: 1  - Lipid Profile; Future    3. Essential hypertension  - lisinopril (PRINIVIL) 5 MG Tab; Take 1 Tablet by mouth every day.  Dispense: 90 Tablet; Refill: 1

## 2022-05-06 ENCOUNTER — NON-PROVIDER VISIT (OUTPATIENT)
Dept: MEDICAL GROUP | Facility: CLINIC | Age: 62
End: 2022-05-06
Payer: COMMERCIAL

## 2022-05-06 ENCOUNTER — HOSPITAL ENCOUNTER (OUTPATIENT)
Facility: MEDICAL CENTER | Age: 62
End: 2022-05-06
Attending: PHYSICIAN ASSISTANT
Payer: COMMERCIAL

## 2022-05-06 PROCEDURE — 80061 LIPID PANEL: CPT

## 2022-05-06 PROCEDURE — 36415 COLL VENOUS BLD VENIPUNCTURE: CPT | Performed by: PHYSICIAN ASSISTANT

## 2022-05-06 PROCEDURE — 83036 HEMOGLOBIN GLYCOSYLATED A1C: CPT

## 2022-05-07 DIAGNOSIS — E78.5 HYPERLIPIDEMIA ASSOCIATED WITH TYPE 2 DIABETES MELLITUS (HCC): ICD-10-CM

## 2022-05-07 DIAGNOSIS — E11.69 HYPERLIPIDEMIA ASSOCIATED WITH TYPE 2 DIABETES MELLITUS (HCC): ICD-10-CM

## 2022-05-07 LAB
CHOLEST SERPL-MCNC: 133 MG/DL (ref 100–199)
EST. AVERAGE GLUCOSE BLD GHB EST-MCNC: 163 MG/DL
HBA1C MFR BLD: 7.3 % (ref 4–5.6)
HDLC SERPL-MCNC: 34 MG/DL
LDLC SERPL CALC-MCNC: 63 MG/DL
TRIGL SERPL-MCNC: 180 MG/DL (ref 0–149)

## 2022-05-17 ENCOUNTER — OFFICE VISIT (OUTPATIENT)
Dept: MEDICAL GROUP | Facility: CLINIC | Age: 62
End: 2022-05-17
Payer: COMMERCIAL

## 2022-05-17 VITALS
OXYGEN SATURATION: 98 % | SYSTOLIC BLOOD PRESSURE: 122 MMHG | WEIGHT: 223.2 LBS | TEMPERATURE: 97.1 F | RESPIRATION RATE: 14 BRPM | HEIGHT: 71 IN | HEART RATE: 78 BPM | DIASTOLIC BLOOD PRESSURE: 84 MMHG | BODY MASS INDEX: 31.25 KG/M2

## 2022-05-17 DIAGNOSIS — E11.9 TYPE 2 DIABETES MELLITUS WITHOUT COMPLICATION, WITHOUT LONG-TERM CURRENT USE OF INSULIN (HCC): ICD-10-CM

## 2022-05-17 PROCEDURE — 99214 OFFICE O/P EST MOD 30 MIN: CPT | Performed by: PHYSICIAN ASSISTANT

## 2022-05-17 ASSESSMENT — FIBROSIS 4 INDEX: FIB4 SCORE: 0.89

## 2022-06-12 NOTE — ASSESSMENT & PLAN NOTE
Currently treated for DM, taking meds and checking blood sugars at home, trying to do DM diet. Current A1c 7.2% down from 7.7% three months ago. Continue current medications.  Follow up in 6 months fr recheck of labs.  Moderately controlled

## 2022-06-12 NOTE — PROGRESS NOTES
Chief Complaint   Patient presents with   • Diabetes     Pt following up on diabetes labs       HISTORY OF PRESENT ILLNESS: Patient is a 61 y.o. male established patient who presents today to discuss the following issues:    Type 2 diabetes mellitus without complication, without long-term current use of insulin ([facility])  Currently treated for DM, taking meds and checking blood sugars at home, trying to do DM diet. Current A1c 7.2% down from 7.7% three months ago. Continue current medications.  Follow up in 6 months fr recheck of labs.  Moderately controlled        Patient Active Problem List    Diagnosis Date Noted   • Abdominal aortic aneurysm without rupture (HCC) 08/13/2020   • Presence of other vascular implants and grafts 08/13/2020   • Rash 03/06/2020   • Obesity, Class I, BMI 30-34.9 03/06/2020   • History of AAA (abdominal aortic aneurysm) repair 07/26/2018   • Type 2 diabetes mellitus without complication, without long-term current use of insulin ([facility]) 07/26/2018   • Erectile dysfunction due to diseases classified elsewhere 07/26/2018   • Essential hypertension 04/25/2016   • Hyperlipidemia associated with type 2 diabetes mellitus (HCC) 04/25/2016   • Nicotine dependence with current use 1969       Allergies:Patient has no known allergies.    Current Outpatient Medications   Medication Sig Dispense Refill   • lisinopril (PRINIVIL) 5 MG Tab Take 1 Tablet by mouth every day. 90 Tablet 1   • metFORMIN ER (GLUCOPHAGE XR) 500 MG TABLET SR 24 HR Take 2 Tablets by mouth 2 times a day. 360 Tablet 1   • simvastatin (ZOCOR) 20 MG Tab Take 1 Tablet by mouth every evening. 90 Tablet 1   • glipiZIDE SR (GLUCOTROL) 10 MG TABLET SR 24 HR Take 1 Tablet by mouth every day. 90 Tablet 1   • triamcinolone acetonide (KENALOG) 0.1 % Cream APPLY 1 APPLICATION TO AFFECTED AREA(S) 2 TIMES A DAY. 30 g 0   • cholecalciferol (VITAMIN D3) 400 UNIT Tab Take 400 Units by mouth every day.     • aspirin 81 MG tablet Take 81 mg by mouth every  day.     • Multiple Vitamins-Minerals (CENTRUM SILVER ADULT 50+) Tab Take  by mouth.       No current facility-administered medications for this visit.       Social History     Tobacco Use   • Smoking status: Current Every Day Smoker     Packs/day: 0.75     Types: Cigarettes     Start date: 1980   • Smokeless tobacco: Never Used   Vaping Use   • Vaping Use: Never used   Substance Use Topics   • Alcohol use: No     Alcohol/week: 0.0 oz     Comment: special occasions 1-2 x year   • Drug use: No       Family Status   Relation Name Status   • Mo  Alive        br ca   • Fa          pr ca   • Bro  Alive   • Bro  Alive   • Desmond . Alive   • Son 3.18.87 Alive     Family History   Problem Relation Age of Onset   • Breast Cancer Mother    • Cancer Father    • Heart Disease Father        Review of Systems:   Constitutional: Negative for fever, chills, weight loss and malaise/fatigue.   HENT: Negative for ear pain, nosebleeds, congestion, sore throat and neck pain.    Eyes: Negative for blurred vision.   Respiratory: Negative for cough, sputum production, shortness of breath and wheezing.    Cardiovascular: Negative for chest pain, palpitations, orthopnea and leg swelling.   Gastrointestinal: Negative for heartburn, nausea, vomiting and abdominal pain.   Genitourinary: Negative for dysuria, urgency and frequency.   Musculoskeletal: Negative for myalgias, back pain and joint pain.   Skin: Negative for rash and itching.   Neurological: Negative for dizziness, tingling, tremors, sensory change, focal weakness and headaches.   Endo/Heme/Allergies: Does not bruise/bleed easily.   Psychiatric/Behavioral: Negative for depression, suicidal ideas and memory loss.  The patient is not nervous/anxious and does not have insomnia.    All other systems reviewed and are negative except as in HPI.    Wt Readings from Last 3 Encounters:   22 101 kg (223 lb 3.2 oz)   22 99.1 kg (218 lb 6.4 oz)   21 100 kg (221  "lb 6.4 oz)   ]    Exam:  /84 (BP Location: Left arm, Patient Position: Sitting, BP Cuff Size: Adult)   Pulse 78   Temp 36.2 °C (97.1 °F) (Temporal)   Resp 14   Ht 1.791 m (5' 10.5\")   Wt 101 kg (223 lb 3.2 oz)   SpO2 98%  Body mass index is 31.57 kg/m².   General:  Well nourished, obese, well developed male. No apparent distress. Not ill appearing.  Eyes: EOM intact, PERRL, conjunctiva non-injected, sclera non-icteric.  Neck: Supple with no cervical lymphadenopathy, JVD, palpable thyroid nodules or carotid bruits.  Pulmonary: Clear to ausculation bilaterally. Normal effort. No rales, ronchi, or wheezing.  Cardiovascular: Regular rate and rhythm without murmur, rub or gallop.   Extremities: Full range of motion. Warm and well perfused with no edema.  Skin: Intact with no obvious rashes or lesions.  Neuro: Cranial nerves I-XII grossly intact.  Psych: Alert and oriented x 3.  Appropriately dressed. Mood and affect appropriate.    Assessment/Plan:  1. Type 2 diabetes mellitus without complication, without long-term current use of insulin (HCC)  Comp Metabolic Panel    HEMOGLOBIN A1C    MICROALBUMIN CREAT RATIO URINE    Lipid Profile       Reviewed risks and benefits of treatment plan. Patient verbally agrees to plan of care.     Return in about 6 months (around 11/17/2022).    Please note that this dictation was created using voice recognition software. I have made every reasonable attempt to correct obvious errors, but I expect that there are errors of grammar and possibly content that I did not discover before finalizing the note.  "

## 2022-09-01 DIAGNOSIS — E11.9 TYPE 2 DIABETES MELLITUS WITHOUT COMPLICATION, WITHOUT LONG-TERM CURRENT USE OF INSULIN (HCC): ICD-10-CM

## 2022-09-01 DIAGNOSIS — E11.69 HYPERLIPIDEMIA ASSOCIATED WITH TYPE 2 DIABETES MELLITUS (HCC): ICD-10-CM

## 2022-09-01 DIAGNOSIS — E78.5 HYPERLIPIDEMIA ASSOCIATED WITH TYPE 2 DIABETES MELLITUS (HCC): ICD-10-CM

## 2022-09-01 NOTE — TELEPHONE ENCOUNTER
Received request via: Pharmacy    Was the patient seen in the last year in this department? Yes    Does the patient have an active prescription (recently filled or refills available) for medication(s) requested? No    Last OV 5/17/2022  Last Labs 5/7/2022

## 2022-09-04 RX ORDER — SIMVASTATIN 20 MG
TABLET ORAL
Qty: 90 TABLET | Refills: 1 | Status: SHIPPED | OUTPATIENT
Start: 2022-09-04 | End: 2023-03-17

## 2022-09-04 RX ORDER — GLIPIZIDE 10 MG/1
10 TABLET, FILM COATED, EXTENDED RELEASE ORAL
Qty: 90 TABLET | Refills: 1 | Status: SHIPPED | OUTPATIENT
Start: 2022-09-04 | End: 2023-03-17

## 2023-03-16 DIAGNOSIS — E78.5 HYPERLIPIDEMIA ASSOCIATED WITH TYPE 2 DIABETES MELLITUS (HCC): ICD-10-CM

## 2023-03-16 DIAGNOSIS — E11.69 HYPERLIPIDEMIA ASSOCIATED WITH TYPE 2 DIABETES MELLITUS (HCC): ICD-10-CM

## 2023-03-16 DIAGNOSIS — E11.9 TYPE 2 DIABETES MELLITUS WITHOUT COMPLICATION, WITHOUT LONG-TERM CURRENT USE OF INSULIN (HCC): ICD-10-CM

## 2023-03-16 NOTE — TELEPHONE ENCOUNTER
Was the patient seen in the last year in this department? Yes    Does patient have an active prescription for medications requested? Yes    Received Request Via: Pharmacy    Hospital Outpatient Visit on 05/06/2022   Component Date Value    Cholesterol,Tot 05/06/2022 133     Triglycerides 05/06/2022 180 (H)     HDL 05/06/2022 34 (A)     LDL 05/06/2022 63     Glycohemoglobin 05/06/2022 7.3 (H)     Est Avg Glucose 05/06/2022 163    ]

## 2023-03-17 RX ORDER — SIMVASTATIN 20 MG
TABLET ORAL
Qty: 90 TABLET | Refills: 0 | Status: SHIPPED | OUTPATIENT
Start: 2023-03-17 | End: 2023-06-21

## 2023-03-17 RX ORDER — GLIPIZIDE 10 MG/1
10 TABLET, FILM COATED, EXTENDED RELEASE ORAL
Qty: 90 TABLET | Refills: 0 | Status: SHIPPED | OUTPATIENT
Start: 2023-03-17 | End: 2023-06-21

## 2023-03-24 ENCOUNTER — NON-PROVIDER VISIT (OUTPATIENT)
Dept: MEDICAL GROUP | Facility: CLINIC | Age: 63
End: 2023-03-24
Payer: COMMERCIAL

## 2023-03-24 ENCOUNTER — HOSPITAL ENCOUNTER (OUTPATIENT)
Facility: MEDICAL CENTER | Age: 63
End: 2023-03-24
Attending: PHYSICIAN ASSISTANT
Payer: COMMERCIAL

## 2023-03-24 DIAGNOSIS — E11.9 TYPE 2 DIABETES MELLITUS WITHOUT COMPLICATION, WITHOUT LONG-TERM CURRENT USE OF INSULIN (HCC): ICD-10-CM

## 2023-03-24 PROCEDURE — 80061 LIPID PANEL: CPT

## 2023-03-24 PROCEDURE — 82570 ASSAY OF URINE CREATININE: CPT

## 2023-03-24 PROCEDURE — 82043 UR ALBUMIN QUANTITATIVE: CPT

## 2023-03-24 PROCEDURE — 36415 COLL VENOUS BLD VENIPUNCTURE: CPT | Performed by: PHYSICIAN ASSISTANT

## 2023-03-24 PROCEDURE — 80053 COMPREHEN METABOLIC PANEL: CPT

## 2023-03-24 NOTE — PROGRESS NOTES
Quirino Barker is a 62 y.o. male here for a non-provider visit for Lab Draw    If abnormal was an in office provider notified today (if so, indicate provider)? Yes    Routed to PCP? Yes

## 2023-03-25 LAB
ALBUMIN SERPL BCP-MCNC: 4.4 G/DL (ref 3.2–4.9)
ALBUMIN/GLOB SERPL: 1.5 G/DL
ALP SERPL-CCNC: 95 U/L (ref 30–99)
ALT SERPL-CCNC: 21 U/L (ref 2–50)
ANION GAP SERPL CALC-SCNC: 11 MMOL/L (ref 7–16)
AST SERPL-CCNC: 17 U/L (ref 12–45)
BILIRUB SERPL-MCNC: 0.3 MG/DL (ref 0.1–1.5)
BUN SERPL-MCNC: 19 MG/DL (ref 8–22)
CALCIUM ALBUM COR SERPL-MCNC: 9.4 MG/DL (ref 8.5–10.5)
CALCIUM SERPL-MCNC: 9.7 MG/DL (ref 8.5–10.5)
CHLORIDE SERPL-SCNC: 101 MMOL/L (ref 96–112)
CHOLEST SERPL-MCNC: 123 MG/DL (ref 100–199)
CO2 SERPL-SCNC: 23 MMOL/L (ref 20–33)
CREAT SERPL-MCNC: 0.82 MG/DL (ref 0.5–1.4)
CREAT UR-MCNC: 57.44 MG/DL
GFR SERPLBLD CREATININE-BSD FMLA CKD-EPI: 100 ML/MIN/1.73 M 2
GLOBULIN SER CALC-MCNC: 2.9 G/DL (ref 1.9–3.5)
GLUCOSE SERPL-MCNC: 202 MG/DL (ref 65–99)
HDLC SERPL-MCNC: 34 MG/DL
LDLC SERPL CALC-MCNC: 58 MG/DL
MICROALBUMIN UR-MCNC: <1.2 MG/DL
MICROALBUMIN/CREAT UR: NORMAL MG/G (ref 0–30)
POTASSIUM SERPL-SCNC: 4.8 MMOL/L (ref 3.6–5.5)
PROT SERPL-MCNC: 7.3 G/DL (ref 6–8.2)
SODIUM SERPL-SCNC: 135 MMOL/L (ref 135–145)
TRIGL SERPL-MCNC: 154 MG/DL (ref 0–149)

## 2023-04-13 ENCOUNTER — OFFICE VISIT (OUTPATIENT)
Dept: MEDICAL GROUP | Facility: CLINIC | Age: 63
End: 2023-04-13
Payer: COMMERCIAL

## 2023-04-13 VITALS
HEART RATE: 85 BPM | BODY MASS INDEX: 30.04 KG/M2 | RESPIRATION RATE: 18 BRPM | DIASTOLIC BLOOD PRESSURE: 70 MMHG | OXYGEN SATURATION: 95 % | HEIGHT: 72 IN | TEMPERATURE: 98.1 F | WEIGHT: 221.8 LBS | SYSTOLIC BLOOD PRESSURE: 124 MMHG

## 2023-04-13 DIAGNOSIS — E78.5 HYPERLIPIDEMIA ASSOCIATED WITH TYPE 2 DIABETES MELLITUS (HCC): ICD-10-CM

## 2023-04-13 DIAGNOSIS — Z12.11 SCREENING FOR COLORECTAL CANCER: ICD-10-CM

## 2023-04-13 DIAGNOSIS — E66.9 OBESITY, CLASS I, BMI 30-34.9: ICD-10-CM

## 2023-04-13 DIAGNOSIS — Z12.12 SCREENING FOR COLORECTAL CANCER: ICD-10-CM

## 2023-04-13 DIAGNOSIS — E11.69 HYPERLIPIDEMIA ASSOCIATED WITH TYPE 2 DIABETES MELLITUS (HCC): ICD-10-CM

## 2023-04-13 DIAGNOSIS — E11.69 TYPE 2 DIABETES MELLITUS WITH OTHER SPECIFIED COMPLICATION, WITHOUT LONG-TERM CURRENT USE OF INSULIN (HCC): ICD-10-CM

## 2023-04-13 LAB
HBA1C MFR BLD: 8 % (ref ?–5.8)
POCT INT CON NEG: NEGATIVE
POCT INT CON POS: POSITIVE

## 2023-04-13 PROCEDURE — 99214 OFFICE O/P EST MOD 30 MIN: CPT | Performed by: PHYSICIAN ASSISTANT

## 2023-04-13 PROCEDURE — 83036 HEMOGLOBIN GLYCOSYLATED A1C: CPT | Performed by: PHYSICIAN ASSISTANT

## 2023-04-13 ASSESSMENT — PATIENT HEALTH QUESTIONNAIRE - PHQ9: CLINICAL INTERPRETATION OF PHQ2 SCORE: 0

## 2023-04-13 NOTE — PROGRESS NOTES
Chief Complaint   Patient presents with    Results     Labs     Medication Refill     Pharmacy change       HISTORY OF PRESENT ILLNESS: Patient is a 62 y.o. male established patient who presents today to discuss the following issues:    Assessment/Plan  Type 2 diabetes mellitus with other specified complication (HCC)  Patient here today for annual lab review. His A1c is currently 8.0% which is up from 1 year ago when it was 7.3%. He is currently on metformin 1000 mg twice a day and glipizide 10 mg daily. We will discontinue the metformin and add pioglitazone-metformin  mg combination. If insurance denies the combination pill we will add pioglitazone 15 mg to his metformin and glipizide. He will follow up in three months with repeat A1c or sooner if there are any problems with the new medication.  Uncontrolled    Hyperlipidemia associated with type 2 diabetes mellitus (HCC)  Chronic condition. Currently taking simvastatin 20 mg. Denies chest pain, shortness of breath or muscle pain. Labs have been checked in past year and are stable on current dose. Will continue prescribed medications as directed.  Triglycerides are elevated and HDL slightly decreased. Have discussed the need to increase physical activity and decrease carbohydrate intake. Recheck labs in one year.  Controlled    Monofilament testing with a 10 gram force: sensation intact: intact bilaterally  Visual Inspection: Feet without maceration, ulcers, fissures.  Pedal pulses: intact bilaterally    Reviewed risks and benefits of treatment plan. Patient verbally agrees to plan of care.     Patient Active Problem List    Diagnosis Date Noted    Abdominal aortic aneurysm without rupture (HCC) 08/13/2020    Presence of other vascular implants and grafts 08/13/2020    Rash 03/06/2020    Obesity, Class I, BMI 30-34.9 03/06/2020    History of AAA (abdominal aortic aneurysm) repair 07/26/2018    Type 2 diabetes mellitus with other specified complication (HCC)  07/26/2018    Erectile dysfunction due to diseases classified elsewhere 07/26/2018    Essential hypertension 04/25/2016    Hyperlipidemia associated with type 2 diabetes mellitus (HCC) 04/25/2016    Nicotine dependence with current use 1969       Allergies:Patient has no known allergies.    Current Outpatient Medications   Medication Sig Dispense Refill    Pioglitazone HCl-Metformin HCl  MG TABLET SR 24 HR Take 1 Tablet by mouth 2 times a day. 180 Tablet 1    simvastatin (ZOCOR) 20 MG Tab TAKE 1 TABLET BY MOUTH EVERY DAY IN THE EVENING 90 Tablet 0    glipiZIDE SR (GLUCOTROL) 10 MG TABLET SR 24 HR TAKE 1 TABLET BY MOUTH EVERY DAY 90 Tablet 0    lisinopril (PRINIVIL) 5 MG Tab TAKE 1 TABLET BY MOUTH EVERY DAY 90 Tablet 1    triamcinolone acetonide (KENALOG) 0.1 % Cream APPLY 1 APPLICATION TO AFFECTED AREA(S) 2 TIMES A DAY. 30 g 0    cholecalciferol (VITAMIN D3) 400 UNIT Tab Take 400 Units by mouth every day.      aspirin 81 MG tablet Take 81 mg by mouth every day.      Multiple Vitamins-Minerals (CENTRUM SILVER ADULT 50+) Tab Take  by mouth.       No current facility-administered medications for this visit.       Wt Readings from Last 3 Encounters:   04/13/23 101 kg (221 lb 12.8 oz)   05/17/22 101 kg (223 lb 3.2 oz)   01/26/22 99.1 kg (218 lb 6.4 oz)   ]    Exam:  /70 (BP Location: Right arm, Patient Position: Sitting, BP Cuff Size: Adult long)   Pulse 85   Temp 36.7 °C (98.1 °F) (Temporal)   Resp 18   Ht 1.829 m (6')   Wt 101 kg (221 lb 12.8 oz)   SpO2 95%  Body mass index is 30.08 kg/m².   General:  Well nourished, well developed male. No apparent distress. Not ill appearing.  Eyes: EOM intact, PERRL, conjunctiva non-injected, sclera non-icteric.  Neck: Supple with no cervical lymphadenopathy, JVD, palpable thyroid nodules or carotid bruits.  Pulmonary: Clear to ausculation bilaterally. Normal effort. No rales, rhonchi, or wheezing.  Cardiovascular: Regular rate and rhythm without murmur, rub or  cherrie.   Extremities: Full range of motion. Warm and well perfused with no edema.  Skin: Intact with no obvious rashes or lesions.  Neuro: Cranial nerves I-XII grossly intact.  Psych: Alert and oriented x 3.  Appropriately dressed. Mood and affect appropriate.    Return in about 3 months (around 7/13/2023) for f/u diabetes.  Please note that this dictation was created using voice recognition software. I have made every reasonable attempt to correct obvious errors, but I expect that there are errors of grammar and possibly content that I did not discover before finalizing the note.

## 2023-04-25 PROBLEM — E11.69 TYPE 2 DIABETES MELLITUS WITH OTHER SPECIFIED COMPLICATION (HCC): Status: ACTIVE | Noted: 2018-07-26

## 2023-04-25 NOTE — ASSESSMENT & PLAN NOTE
Patient here today for annual lab review. His A1c is currently 8.0% which is up from 1 year ago when it was 7.3%. He is currently on metformin 1000 mg twice a day and glipizide 10 mg daily. We will discontinue the metformin and add pioglitazone-metformin  mg combination. If insurance denies the combination pill we will add pioglitazone 15 mg to his metformin and glipizide. He will follow up in three months with repeat A1c or sooner if there are any problems with the new medication.  Uncontrolled

## 2023-04-25 NOTE — ASSESSMENT & PLAN NOTE
Chronic condition. Currently taking simvastatin 20 mg. Denies chest pain, shortness of breath or muscle pain. Labs have been checked in past year and are stable on current dose. Will continue prescribed medications as directed.  Triglycerides are elevated and HDL slightly decreased. Have discussed the need to increase physical activity and decrease carbohydrate intake. Recheck labs in one year.  Controlled

## 2023-05-05 ENCOUNTER — TELEPHONE (OUTPATIENT)
Dept: MEDICAL GROUP | Facility: CLINIC | Age: 63
End: 2023-05-05
Payer: COMMERCIAL

## 2023-05-05 NOTE — TELEPHONE ENCOUNTER
Patient was notified that they are no longer do the combination Rx and would like to have it resent as separate please.   Please call patient when this is done so he can .    [phone]

## 2023-05-10 DIAGNOSIS — E11.69 TYPE 2 DIABETES MELLITUS WITH OTHER SPECIFIED COMPLICATION, WITHOUT LONG-TERM CURRENT USE OF INSULIN (HCC): ICD-10-CM

## 2023-05-10 RX ORDER — PIOGLITAZONEHYDROCHLORIDE 15 MG/1
15 TABLET ORAL DAILY
Qty: 90 TABLET | Refills: 1 | Status: SHIPPED | OUTPATIENT
Start: 2023-05-10 | End: 2023-11-13 | Stop reason: SDUPTHER

## 2023-06-20 DIAGNOSIS — I10 ESSENTIAL HYPERTENSION: ICD-10-CM

## 2023-06-20 RX ORDER — LISINOPRIL 5 MG/1
5 TABLET ORAL
Qty: 90 TABLET | Refills: 0 | Status: SHIPPED | OUTPATIENT
Start: 2023-06-20 | End: 2023-08-17 | Stop reason: SDUPTHER

## 2023-06-20 NOTE — TELEPHONE ENCOUNTER
Was the patient seen in the last year in this department? Yes    Does patient have an active prescription for medications requested? Yes    Received Request Via: Pharmacy    Office Visit on 04/13/2023   Component Date Value    Glycohemoglobin 04/13/2023 8.0 (A)     Internal Control Positive 04/13/2023 Positive     Internal Control Negative 04/13/2023 Negative    Hospital Outpatient Visit on 03/24/2023   Component Date Value    Sodium 03/24/2023 135     Potassium 03/24/2023 4.8     Chloride 03/24/2023 101     Co2 03/24/2023 23     Anion Gap 03/24/2023 11.0     Glucose 03/24/2023 202 (H)     Bun 03/24/2023 19     Creatinine 03/24/2023 0.82     Calcium 03/24/2023 9.7     AST(SGOT) 03/24/2023 17     ALT(SGPT) 03/24/2023 21     Alkaline Phosphatase 03/24/2023 95     Total Bilirubin 03/24/2023 0.3     Albumin 03/24/2023 4.4     Total Protein 03/24/2023 7.3     Globulin 03/24/2023 2.9     A-G Ratio 03/24/2023 1.5     Creatinine, Urine 03/24/2023 57.44     Microalbumin, Urine Rand* 03/24/2023 <1.2     Micro Alb Creat Ratio 03/24/2023 see below     Cholesterol,Tot 03/24/2023 123     Triglycerides 03/24/2023 154 (H)     HDL 03/24/2023 34 (A)     LDL 03/24/2023 58     Correct Calcium 03/24/2023 9.4     GFR (CKD-EPI) 03/24/2023 100    ]

## 2023-06-21 DIAGNOSIS — E78.5 HYPERLIPIDEMIA ASSOCIATED WITH TYPE 2 DIABETES MELLITUS (HCC): ICD-10-CM

## 2023-06-21 DIAGNOSIS — E11.9 TYPE 2 DIABETES MELLITUS WITHOUT COMPLICATION, WITHOUT LONG-TERM CURRENT USE OF INSULIN (HCC): ICD-10-CM

## 2023-06-21 DIAGNOSIS — E11.69 HYPERLIPIDEMIA ASSOCIATED WITH TYPE 2 DIABETES MELLITUS (HCC): ICD-10-CM

## 2023-06-21 RX ORDER — SIMVASTATIN 20 MG
TABLET ORAL
Qty: 90 TABLET | Refills: 0 | Status: SHIPPED | OUTPATIENT
Start: 2023-06-21 | End: 2023-08-17 | Stop reason: SDUPTHER

## 2023-06-21 RX ORDER — GLIPIZIDE 10 MG/1
10 TABLET, FILM COATED, EXTENDED RELEASE ORAL
Qty: 90 TABLET | Refills: 0 | Status: SHIPPED | OUTPATIENT
Start: 2023-06-21 | End: 2023-08-17 | Stop reason: SDUPTHER

## 2023-07-13 DIAGNOSIS — L30.9 ECZEMA, UNSPECIFIED TYPE: ICD-10-CM

## 2023-07-13 NOTE — TELEPHONE ENCOUNTER
Was the patient seen in the last year in this department? Yes    Does patient have an active prescription for medications requested? Yes    Received Request Via: Patient    Office Visit on 04/13/2023   Component Date Value    Glycohemoglobin 04/13/2023 8.0 (A)     Internal Control Positive 04/13/2023 Positive     Internal Control Negative 04/13/2023 Negative    Hospital Outpatient Visit on 03/24/2023   Component Date Value    Sodium 03/24/2023 135     Potassium 03/24/2023 4.8     Chloride 03/24/2023 101     Co2 03/24/2023 23     Anion Gap 03/24/2023 11.0     Glucose 03/24/2023 202 (H)     Bun 03/24/2023 19     Creatinine 03/24/2023 0.82     Calcium 03/24/2023 9.7     AST(SGOT) 03/24/2023 17     ALT(SGPT) 03/24/2023 21     Alkaline Phosphatase 03/24/2023 95     Total Bilirubin 03/24/2023 0.3     Albumin 03/24/2023 4.4     Total Protein 03/24/2023 7.3     Globulin 03/24/2023 2.9     A-G Ratio 03/24/2023 1.5     Creatinine, Urine 03/24/2023 57.44     Microalbumin, Urine Rand* 03/24/2023 <1.2     Micro Alb Creat Ratio 03/24/2023 see below     Cholesterol,Tot 03/24/2023 123     Triglycerides 03/24/2023 154 (H)     HDL 03/24/2023 34 (A)     LDL 03/24/2023 58     Correct Calcium 03/24/2023 9.4     GFR (CKD-EPI) 03/24/2023 100    ]

## 2023-07-14 RX ORDER — TRIAMCINOLONE ACETONIDE 1 MG/G
1 CREAM TOPICAL 2 TIMES DAILY
Qty: 30 G | Refills: 0 | Status: SHIPPED | OUTPATIENT
Start: 2023-07-14 | End: 2023-11-09 | Stop reason: SDUPTHER

## 2023-07-28 ENCOUNTER — APPOINTMENT (OUTPATIENT)
Dept: MEDICAL GROUP | Facility: CLINIC | Age: 63
End: 2023-07-28
Payer: COMMERCIAL

## 2023-08-16 ENCOUNTER — TELEPHONE (OUTPATIENT)
Dept: URGENT CARE | Facility: PHYSICIAN GROUP | Age: 63
End: 2023-08-16
Payer: COMMERCIAL

## 2023-08-17 ENCOUNTER — APPOINTMENT (OUTPATIENT)
Dept: MEDICAL GROUP | Facility: CLINIC | Age: 63
End: 2023-08-17
Payer: COMMERCIAL

## 2023-08-17 ENCOUNTER — OFFICE VISIT (OUTPATIENT)
Dept: MEDICAL GROUP | Facility: CLINIC | Age: 63
End: 2023-08-17
Payer: COMMERCIAL

## 2023-08-17 VITALS
WEIGHT: 223.2 LBS | DIASTOLIC BLOOD PRESSURE: 82 MMHG | OXYGEN SATURATION: 95 % | SYSTOLIC BLOOD PRESSURE: 124 MMHG | BODY MASS INDEX: 31.25 KG/M2 | HEART RATE: 82 BPM | TEMPERATURE: 98.2 F | HEIGHT: 71 IN | RESPIRATION RATE: 16 BRPM

## 2023-08-17 DIAGNOSIS — E11.69 TYPE 2 DIABETES MELLITUS WITH OTHER SPECIFIED COMPLICATION (HCC): ICD-10-CM

## 2023-08-17 DIAGNOSIS — E11.69 TYPE 2 DIABETES MELLITUS WITH OTHER SPECIFIED COMPLICATION, WITHOUT LONG-TERM CURRENT USE OF INSULIN (HCC): ICD-10-CM

## 2023-08-17 DIAGNOSIS — E78.5 HYPERLIPIDEMIA ASSOCIATED WITH TYPE 2 DIABETES MELLITUS (HCC): ICD-10-CM

## 2023-08-17 DIAGNOSIS — I10 ESSENTIAL HYPERTENSION: ICD-10-CM

## 2023-08-17 DIAGNOSIS — Z12.5 PROSTATE CANCER SCREENING: ICD-10-CM

## 2023-08-17 DIAGNOSIS — E11.69 HYPERLIPIDEMIA ASSOCIATED WITH TYPE 2 DIABETES MELLITUS (HCC): ICD-10-CM

## 2023-08-17 DIAGNOSIS — E11.9 TYPE 2 DIABETES MELLITUS WITHOUT COMPLICATION, WITHOUT LONG-TERM CURRENT USE OF INSULIN (HCC): ICD-10-CM

## 2023-08-17 DIAGNOSIS — Z98.890 HISTORY OF AAA (ABDOMINAL AORTIC ANEURYSM) REPAIR: ICD-10-CM

## 2023-08-17 LAB
HBA1C MFR BLD: 6.9 % (ref ?–5.8)
POCT INT CON NEG: NEGATIVE
POCT INT CON POS: POSITIVE

## 2023-08-17 PROCEDURE — 99214 OFFICE O/P EST MOD 30 MIN: CPT | Performed by: PHYSICIAN ASSISTANT

## 2023-08-17 PROCEDURE — 3079F DIAST BP 80-89 MM HG: CPT | Performed by: PHYSICIAN ASSISTANT

## 2023-08-17 PROCEDURE — 3074F SYST BP LT 130 MM HG: CPT | Performed by: PHYSICIAN ASSISTANT

## 2023-08-17 PROCEDURE — 83036 HEMOGLOBIN GLYCOSYLATED A1C: CPT | Performed by: PHYSICIAN ASSISTANT

## 2023-08-17 RX ORDER — GLIPIZIDE 10 MG/1
10 TABLET, FILM COATED, EXTENDED RELEASE ORAL
Qty: 90 TABLET | Refills: 1 | Status: SHIPPED | OUTPATIENT
Start: 2023-08-17 | End: 2024-03-11

## 2023-08-17 RX ORDER — SIMVASTATIN 20 MG
20 TABLET ORAL EVERY EVENING
Qty: 90 TABLET | Refills: 1 | Status: SHIPPED | OUTPATIENT
Start: 2023-08-17

## 2023-08-17 RX ORDER — LISINOPRIL 5 MG/1
5 TABLET ORAL
Qty: 90 TABLET | Refills: 1 | Status: SHIPPED | OUTPATIENT
Start: 2023-08-17

## 2023-08-17 NOTE — PROGRESS NOTES
"cc:  diabetes    Subjective:     Quirino Barker is a 63 y.o. male presenting for diabetes      Patient presents to the office for diabetes.  Patient presents the office today to follow-up with his diabetes.  He is currently a patient of Janey Wheatley.  However she is out of the office today.  He was originally on her schedule.    Patient did have an abdominal aortic aneurysm repair with vascular medicine.  He was last seen in July 2022.  He states that he has an upcoming appointment with vascular medicine.     Patient is due for a prostate cancer screen.      Review of systems:  See above.   Denies any symptoms unless previously indicated.        Current Outpatient Medications:     lisinopril (PRINIVIL) 5 MG Tab, Take 1 Tablet by mouth every day., Disp: 90 Tablet, Rfl: 1    glipiZIDE SR (GLUCOTROL) 10 MG TABLET SR 24 HR, Take 1 Tablet by mouth every day., Disp: 90 Tablet, Rfl: 1    simvastatin (ZOCOR) 20 MG Tab, Take 1 Tablet by mouth every evening., Disp: 90 Tablet, Rfl: 1    triamcinolone acetonide (KENALOG) 0.1 % Cream, Apply 1 Application  topically 2 times a day., Disp: 30 g, Rfl: 0    pioglitazone (ACTOS) 15 MG Tab, Take 1 Tablet by mouth every day., Disp: 90 Tablet, Rfl: 1    metformin (GLUCOPHAGE) 1000 MG tablet, Take 1 Tablet by mouth 2 times a day with meals., Disp: 180 Tablet, Rfl: 1    cholecalciferol (VITAMIN D3) 400 UNIT Tab, Take 400 Units by mouth every day., Disp: , Rfl:     aspirin 81 MG tablet, Take 81 mg by mouth every day., Disp: , Rfl:     Multiple Vitamins-Minerals (CENTRUM SILVER ADULT 50+) Tab, Take  by mouth., Disp: , Rfl:     Allergies, past medical history, past surgical history, family history, social history reviewed and updated    Objective:     Vitals: /82 (BP Location: Left arm, Patient Position: Sitting, BP Cuff Size: Adult)   Pulse 82   Temp 36.8 °C (98.2 °F) (Temporal)   Resp 16   Ht 1.803 m (5' 11\") Comment: with shoes  Wt 101 kg (223 lb 3.2 oz) Comment: with shoes  " SpO2 95%   BMI 31.13 kg/m²   General: Alert, pleasant, NAD  EYES:   PERRL, EOMI, no icterus or pallor.  Conjunctivae and lids normal.   HENT:  Normocephalic.  External ears normal. Neck supple.   Heart: Regular rate and rhythm.  S1 and S2 normal.  No murmurs appreciated.  Respiratory: Normal respiratory effort.  Clear to auscultation bilaterally.  Abdomen: Not obese.  Skin: Warm, dry, no rashes.  Musculoskeletal: Gait is normal.  Moves all extremities well.    Extremities: normal range of motion all extremities.   Neurological: No tremors, sensation grossly intact, patellar reflexes 2+ symmetric, tone/strength normal, gait is normal, CN2-12 intact.  Psych:  Affect/mood is normal, judgement is good, memory is intact, grooming is appropriate.    Assessment/Plan:     Quirino was seen today for diabetes follow-up.    Diagnoses and all orders for this visit:    Type 2 diabetes mellitus with other specified complication (HCC)  -     POCT A1C  -     Lipid Profile; Future  -     Comp Metabolic Panel; Future  -     HEMOGLOBIN A1C; Future  Type 2 diabetes mellitus without complication, without long-term current use of insulin (HCC)  -     glipiZIDE SR (GLUCOTROL) 10 MG TABLET SR 24 HR; Take 1 Tablet by mouth every day.  -     Lipid Profile; Future  -     Comp Metabolic Panel; Future  -     HEMOGLOBIN A1C; Future  Hyperlipidemia associated with type 2 diabetes mellitus (HCC)  -     simvastatin (ZOCOR) 20 MG Tab; Take 1 Tablet by mouth every evening.  -     Lipid Profile; Future  -     Comp Metabolic Panel; Future  Essential hypertension  -     lisinopril (PRINIVIL) 5 MG Tab; Take 1 Tablet by mouth every day.  -     Lipid Profile; Future  -     Comp Metabolic Panel; Future    Lab work is been ordered to evaluate further.  Patient is to follow-up in 3 months.  Patient to follow-up with his primary care provider.    Prostate cancer screening  -     PROSTATE SPECIFIC AG SCREENING; Future    Routine labs ordered to evaluate  further.    History of AAA (abdominal aortic aneurysm) repair      Patient to follow-up with vascular medicine as he is due for his annual checkup.      No follow-ups on file.    Please note that this dictation was created using voice recognition software. I have made every reasonable attempt to correct obvious errors, but expect that there are errors of grammar and possible content that I did not discover before finalizing note.

## 2023-10-13 ENCOUNTER — DOCUMENTATION (OUTPATIENT)
Dept: HEALTH INFORMATION MANAGEMENT | Facility: OTHER | Age: 63
End: 2023-10-13
Payer: COMMERCIAL

## 2023-11-09 DIAGNOSIS — L30.9 ECZEMA, UNSPECIFIED TYPE: ICD-10-CM

## 2023-11-09 DIAGNOSIS — E11.69 TYPE 2 DIABETES MELLITUS WITH OTHER SPECIFIED COMPLICATION, WITHOUT LONG-TERM CURRENT USE OF INSULIN (HCC): ICD-10-CM

## 2023-11-09 RX ORDER — TRIAMCINOLONE ACETONIDE 1 MG/G
1 CREAM TOPICAL 2 TIMES DAILY
Qty: 30 G | Refills: 0 | Status: SHIPPED | OUTPATIENT
Start: 2023-11-09 | End: 2024-02-23

## 2023-11-09 NOTE — TELEPHONE ENCOUNTER
Received request via: Pharmacy    Was the patient seen in the last year in this department? Yes    Does the patient have an active prescription (recently filled or refills available) for medication(s) requested? No    Does the patient have detention Plus and need 100 day supply (blood pressure, diabetes and cholesterol meds only)? Patient does not have SCP      Last Ov 8/17/23  Last Labs 3/24/23

## 2023-11-13 DIAGNOSIS — E11.69 TYPE 2 DIABETES MELLITUS WITH OTHER SPECIFIED COMPLICATION, WITHOUT LONG-TERM CURRENT USE OF INSULIN (HCC): ICD-10-CM

## 2023-11-13 NOTE — TELEPHONE ENCOUNTER
Received request via: Pharmacy    Was the patient seen in the last year in this department? Yes    Does the patient have an active prescription (recently filled or refills available) for medication(s) requested? No    Does the patient have halfway Plus and need 100 day supply (blood pressure, diabetes and cholesterol meds only)? Patient does not have SCP      Last Ov 8/17/23  Last Labs 4/13/23

## 2023-11-15 RX ORDER — PIOGLITAZONEHYDROCHLORIDE 15 MG/1
15 TABLET ORAL DAILY
Qty: 90 TABLET | Refills: 0 | Status: SHIPPED | OUTPATIENT
Start: 2023-11-15 | End: 2024-02-05

## 2024-02-01 DIAGNOSIS — E11.69 TYPE 2 DIABETES MELLITUS WITH OTHER SPECIFIED COMPLICATION, WITHOUT LONG-TERM CURRENT USE OF INSULIN (HCC): ICD-10-CM

## 2024-02-02 NOTE — TELEPHONE ENCOUNTER
Requested Prescriptions     Pending Prescriptions Disp Refills    pioglitazone (ACTOS) 15 MG Tab [Pharmacy Med Name: Pioglitazone HCl 15 MG Oral Tablet] 90 Tablet 0     Sig: Take 1 tablet by mouth once daily      Last office visit: 8/17/23  Last lab: 3/24/23

## 2024-02-05 RX ORDER — PIOGLITAZONEHYDROCHLORIDE 15 MG/1
15 TABLET ORAL DAILY
Qty: 90 TABLET | Refills: 0 | Status: SHIPPED | OUTPATIENT
Start: 2024-02-05

## 2024-02-23 ENCOUNTER — TELEPHONE (OUTPATIENT)
Dept: HEALTH INFORMATION MANAGEMENT | Facility: OTHER | Age: 64
End: 2024-02-23
Payer: COMMERCIAL

## 2024-02-23 DIAGNOSIS — L30.9 ECZEMA, UNSPECIFIED TYPE: ICD-10-CM

## 2024-02-23 RX ORDER — TRIAMCINOLONE ACETONIDE 1 MG/G
1 CREAM TOPICAL 2 TIMES DAILY
Qty: 30 G | Refills: 0 | Status: SHIPPED | OUTPATIENT
Start: 2024-02-23

## 2024-02-23 NOTE — TELEPHONE ENCOUNTER
Requested Prescriptions     Pending Prescriptions Disp Refills    triamcinolone acetonide (KENALOG) 0.1 % Cream [Pharmacy Med Name: Triamcinolone Acetonide 0.1 % External Cream] 30 g 0     Sig: APPLY TO AFFECTED AREA 2 TIMES A DAY      Last office visit: 8/17/23  Last lab: 3/24/23

## 2024-03-08 DIAGNOSIS — E11.9 TYPE 2 DIABETES MELLITUS WITHOUT COMPLICATION, WITHOUT LONG-TERM CURRENT USE OF INSULIN (HCC): ICD-10-CM

## 2024-03-09 NOTE — TELEPHONE ENCOUNTER
Requested Prescriptions     Pending Prescriptions Disp Refills    glipiZIDE SR (GLUCOTROL) 10 MG TABLET SR 24 HR [Pharmacy Med Name: glipiZIDE ER 10 MG Oral Tablet Extended Release 24 Hour] 90 Tablet 0     Sig: Take 1 tablet by mouth once daily     Last office visit: 8/17/23  Last lab: 3/24/23

## 2024-03-11 RX ORDER — GLIPIZIDE 10 MG/1
10 TABLET, FILM COATED, EXTENDED RELEASE ORAL DAILY
Qty: 90 TABLET | Refills: 0 | Status: SHIPPED | OUTPATIENT
Start: 2024-03-11

## 2024-05-01 DIAGNOSIS — I10 ESSENTIAL HYPERTENSION: ICD-10-CM

## 2024-05-01 DIAGNOSIS — E78.5 HYPERLIPIDEMIA ASSOCIATED WITH TYPE 2 DIABETES MELLITUS (HCC): ICD-10-CM

## 2024-05-01 DIAGNOSIS — E11.69 HYPERLIPIDEMIA ASSOCIATED WITH TYPE 2 DIABETES MELLITUS (HCC): ICD-10-CM

## 2024-05-02 RX ORDER — LISINOPRIL 5 MG/1
5 TABLET ORAL DAILY
Qty: 30 TABLET | Refills: 0 | Status: SHIPPED | OUTPATIENT
Start: 2024-05-02

## 2024-05-02 RX ORDER — SIMVASTATIN 20 MG
20 TABLET ORAL EVERY EVENING
Qty: 30 TABLET | Refills: 0 | Status: SHIPPED | OUTPATIENT
Start: 2024-05-02 | End: 2024-05-23 | Stop reason: SDUPTHER

## 2024-05-02 NOTE — TELEPHONE ENCOUNTER
Received request via: Patient    Was the patient seen in the last year in this department? Yes    Does the patient have an active prescription (recently filled or refills available) for medication(s) requested?  Yes    Pharmacy Name: [facility] in [place]    Does the patient have retirement Plus and need 100 day supply (blood pressure, diabetes and cholesterol meds only)? Patient does not have SCP

## 2024-05-23 ENCOUNTER — NON-PROVIDER VISIT (OUTPATIENT)
Dept: MEDICAL GROUP | Facility: CLINIC | Age: 64
End: 2024-05-23
Payer: COMMERCIAL

## 2024-05-23 ENCOUNTER — HOSPITAL ENCOUNTER (OUTPATIENT)
Facility: MEDICAL CENTER | Age: 64
End: 2024-05-23
Attending: PHYSICIAN ASSISTANT
Payer: COMMERCIAL

## 2024-05-23 DIAGNOSIS — I10 ESSENTIAL HYPERTENSION: ICD-10-CM

## 2024-05-23 DIAGNOSIS — E11.9 TYPE 2 DIABETES MELLITUS WITHOUT COMPLICATION, WITHOUT LONG-TERM CURRENT USE OF INSULIN (HCC): ICD-10-CM

## 2024-05-23 DIAGNOSIS — E78.5 HYPERLIPIDEMIA ASSOCIATED WITH TYPE 2 DIABETES MELLITUS (HCC): ICD-10-CM

## 2024-05-23 DIAGNOSIS — E11.69 TYPE 2 DIABETES MELLITUS WITH OTHER SPECIFIED COMPLICATION (HCC): ICD-10-CM

## 2024-05-23 DIAGNOSIS — E11.69 HYPERLIPIDEMIA ASSOCIATED WITH TYPE 2 DIABETES MELLITUS (HCC): ICD-10-CM

## 2024-05-23 DIAGNOSIS — E11.69 TYPE 2 DIABETES MELLITUS WITH OTHER SPECIFIED COMPLICATION, WITHOUT LONG-TERM CURRENT USE OF INSULIN (HCC): ICD-10-CM

## 2024-05-23 PROCEDURE — 36415 COLL VENOUS BLD VENIPUNCTURE: CPT | Performed by: PHYSICIAN ASSISTANT

## 2024-05-23 RX ORDER — SIMVASTATIN 20 MG
20 TABLET ORAL EVERY EVENING
Qty: 30 TABLET | Refills: 0 | Status: SHIPPED | OUTPATIENT
Start: 2024-05-23

## 2024-05-23 RX ORDER — PIOGLITAZONEHYDROCHLORIDE 15 MG/1
15 TABLET ORAL DAILY
Qty: 30 TABLET | Refills: 0 | Status: SHIPPED | OUTPATIENT
Start: 2024-05-23

## 2024-05-23 NOTE — PROGRESS NOTES
Quirino Barker is a 63 y.o. male here for a non-provider visit for a lab draw on 5/23/2024 at 9:36 AM.    Procedure performed:  Venipuncture     Anatomical site:  Left Antecubital Area    Equipment used:  21 g vacutainer     Labs drawn:  Comp Metabolic Panel  and Lipid Profile    Ordering provider:  Josselyn Batista PA-C    Lab draw completed by:  Salvatore Kolb Ass't

## 2024-05-23 NOTE — TELEPHONE ENCOUNTER
Requested Prescriptions     Pending Prescriptions Disp Refills    metformin (GLUCOPHAGE) 1000 MG tablet 180 Tablet 0     Sig: Take 1 Tablet by mouth 2 times a day with meals.    simvastatin (ZOCOR) 20 MG Tab 30 Tablet 0     Sig: Take 1 Tablet by mouth every evening.    pioglitazone (ACTOS) 15 MG Tab 90 Tablet 0     Sig: Take 1 Tablet by mouth every day.     LOV 08/17/23  Labs 03/25/23    Patient had labs drawn today and stated he needs refills on above mediction

## 2024-05-24 LAB
ALBUMIN SERPL BCP-MCNC: 4.5 G/DL (ref 3.2–4.9)
ALBUMIN/GLOB SERPL: 1.7 G/DL
ALP SERPL-CCNC: 89 U/L (ref 30–99)
ALT SERPL-CCNC: 24 U/L (ref 2–50)
ANION GAP SERPL CALC-SCNC: 14 MMOL/L (ref 7–16)
AST SERPL-CCNC: 21 U/L (ref 12–45)
BILIRUB SERPL-MCNC: 0.4 MG/DL (ref 0.1–1.5)
BUN SERPL-MCNC: 16 MG/DL (ref 8–22)
CALCIUM ALBUM COR SERPL-MCNC: 9.4 MG/DL (ref 8.5–10.5)
CALCIUM SERPL-MCNC: 9.8 MG/DL (ref 8.5–10.5)
CHLORIDE SERPL-SCNC: 99 MMOL/L (ref 96–112)
CHOLEST SERPL-MCNC: 141 MG/DL (ref 100–199)
CO2 SERPL-SCNC: 21 MMOL/L (ref 20–33)
CREAT SERPL-MCNC: 0.83 MG/DL (ref 0.5–1.4)
GFR SERPLBLD CREATININE-BSD FMLA CKD-EPI: 99 ML/MIN/1.73 M 2
GLOBULIN SER CALC-MCNC: 2.7 G/DL (ref 1.9–3.5)
GLUCOSE SERPL-MCNC: 207 MG/DL (ref 65–99)
HDLC SERPL-MCNC: 36 MG/DL
LDLC SERPL CALC-MCNC: 61 MG/DL
POTASSIUM SERPL-SCNC: 4.9 MMOL/L (ref 3.6–5.5)
PROT SERPL-MCNC: 7.2 G/DL (ref 6–8.2)
SODIUM SERPL-SCNC: 134 MMOL/L (ref 135–145)
TRIGL SERPL-MCNC: 220 MG/DL (ref 0–149)

## 2024-06-07 DIAGNOSIS — E11.9 TYPE 2 DIABETES MELLITUS WITHOUT COMPLICATION, WITHOUT LONG-TERM CURRENT USE OF INSULIN (HCC): ICD-10-CM

## 2024-06-07 RX ORDER — GLIPIZIDE 10 MG/1
10 TABLET, FILM COATED, EXTENDED RELEASE ORAL DAILY
Qty: 30 TABLET | Refills: 0 | Status: SHIPPED | OUTPATIENT
Start: 2024-06-07 | End: 2024-06-27 | Stop reason: SDUPTHER

## 2024-06-07 NOTE — TELEPHONE ENCOUNTER
Received request via: Patient    Was the patient seen in the last year in this department? Yes    Does the patient have an active prescription (recently filled or refills available) for medication(s) requested?  Yes    Pharmacy Name: [facility] in [place]    Does the patient have longterm Plus and need 100 day supply (blood pressure, diabetes and cholesterol meds only)? Patient does not have SCP

## 2024-06-10 DIAGNOSIS — E11.69 TYPE 2 DIABETES MELLITUS WITH OTHER SPECIFIED COMPLICATION, WITHOUT LONG-TERM CURRENT USE OF INSULIN (HCC): ICD-10-CM

## 2024-06-10 DIAGNOSIS — L30.9 ECZEMA, UNSPECIFIED TYPE: ICD-10-CM

## 2024-06-10 RX ORDER — PIOGLITAZONEHYDROCHLORIDE 15 MG/1
15 TABLET ORAL DAILY
Qty: 30 TABLET | Refills: 3 | Status: SHIPPED | OUTPATIENT
Start: 2024-06-10 | End: 2024-06-27 | Stop reason: SDUPTHER

## 2024-06-10 RX ORDER — TRIAMCINOLONE ACETONIDE 1 MG/G
CREAM TOPICAL
Qty: 30 G | Refills: 0 | Status: SHIPPED | OUTPATIENT
Start: 2024-06-10

## 2024-06-10 NOTE — TELEPHONE ENCOUNTER
Received request via: Patient    Was the patient seen in the last year in this department? Yes    Does the patient have an active prescription (recently filled or refills available) for medication(s) requested?  Yes    Pharmacy Name: [facility] in [place]    Does the patient have FPC Plus and need 100 day supply (blood pressure, diabetes and cholesterol meds only)? Patient does not have SCP

## 2024-06-10 NOTE — TELEPHONE ENCOUNTER
Received request via: Patient    Was the patient seen in the last year in this department? Yes    Does the patient have an active prescription (recently filled or refills available) for medication(s) requested?  Yes    Pharmacy Name: [facility] in [place]    Does the patient have correction Plus and need 100 day supply (blood pressure, diabetes and cholesterol meds only)? Patient does not have SCP

## 2024-06-17 DIAGNOSIS — I10 ESSENTIAL HYPERTENSION: ICD-10-CM

## 2024-06-18 RX ORDER — LISINOPRIL 5 MG/1
5 TABLET ORAL DAILY
Qty: 15 TABLET | Refills: 0 | Status: SHIPPED | OUTPATIENT
Start: 2024-06-18 | End: 2024-06-27 | Stop reason: SDUPTHER

## 2024-06-18 NOTE — TELEPHONE ENCOUNTER
Received request via: Patient    Was the patient seen in the last year in this department? Yes    Does the patient have an active prescription (recently filled or refills available) for medication(s) requested?  Yes    Pharmacy Name: [facility] in [place]    Does the patient have correction Plus and need 100 day supply (blood pressure, diabetes and cholesterol meds only)? Patient does not have SCP

## 2024-06-27 ENCOUNTER — HOSPITAL ENCOUNTER (OUTPATIENT)
Facility: MEDICAL CENTER | Age: 64
End: 2024-06-27
Attending: PHYSICIAN ASSISTANT
Payer: COMMERCIAL

## 2024-06-27 ENCOUNTER — OFFICE VISIT (OUTPATIENT)
Dept: MEDICAL GROUP | Facility: CLINIC | Age: 64
End: 2024-06-27
Payer: COMMERCIAL

## 2024-06-27 VITALS
OXYGEN SATURATION: 99 % | RESPIRATION RATE: 18 BRPM | HEART RATE: 72 BPM | BODY MASS INDEX: 30.85 KG/M2 | DIASTOLIC BLOOD PRESSURE: 76 MMHG | SYSTOLIC BLOOD PRESSURE: 120 MMHG | TEMPERATURE: 98.3 F | HEIGHT: 72 IN | WEIGHT: 227.74 LBS

## 2024-06-27 DIAGNOSIS — E11.69 HYPERLIPIDEMIA ASSOCIATED WITH TYPE 2 DIABETES MELLITUS (HCC): ICD-10-CM

## 2024-06-27 DIAGNOSIS — E11.69 TYPE 2 DIABETES MELLITUS WITH OTHER SPECIFIED COMPLICATION (HCC): ICD-10-CM

## 2024-06-27 DIAGNOSIS — I10 ESSENTIAL HYPERTENSION: ICD-10-CM

## 2024-06-27 DIAGNOSIS — E11.69 TYPE 2 DIABETES MELLITUS WITH OTHER SPECIFIED COMPLICATION, WITHOUT LONG-TERM CURRENT USE OF INSULIN (HCC): ICD-10-CM

## 2024-06-27 DIAGNOSIS — E11.9 TYPE 2 DIABETES MELLITUS WITHOUT COMPLICATION, WITHOUT LONG-TERM CURRENT USE OF INSULIN (HCC): ICD-10-CM

## 2024-06-27 DIAGNOSIS — L98.9 SKIN LESION: ICD-10-CM

## 2024-06-27 DIAGNOSIS — E78.5 HYPERLIPIDEMIA ASSOCIATED WITH TYPE 2 DIABETES MELLITUS (HCC): ICD-10-CM

## 2024-06-27 LAB
CREAT UR-MCNC: 67.48 MG/DL
HBA1C MFR BLD: 8 % (ref ?–5.8)
MICROALBUMIN UR-MCNC: <1.2 MG/DL
MICROALBUMIN/CREAT UR: NORMAL MG/G (ref 0–30)
POCT INT CON NEG: NEGATIVE
POCT INT CON POS: POSITIVE

## 2024-06-27 PROCEDURE — 3078F DIAST BP <80 MM HG: CPT | Performed by: PHYSICIAN ASSISTANT

## 2024-06-27 PROCEDURE — 99214 OFFICE O/P EST MOD 30 MIN: CPT | Performed by: PHYSICIAN ASSISTANT

## 2024-06-27 PROCEDURE — 82043 UR ALBUMIN QUANTITATIVE: CPT

## 2024-06-27 PROCEDURE — 82570 ASSAY OF URINE CREATININE: CPT

## 2024-06-27 PROCEDURE — 83036 HEMOGLOBIN GLYCOSYLATED A1C: CPT | Performed by: PHYSICIAN ASSISTANT

## 2024-06-27 PROCEDURE — 3074F SYST BP LT 130 MM HG: CPT | Performed by: PHYSICIAN ASSISTANT

## 2024-06-27 RX ORDER — GLUCOSAMINE HCL/CHONDROITIN SU 500-400 MG
CAPSULE ORAL
Qty: 200 EACH | Refills: 3 | Status: SHIPPED | OUTPATIENT
Start: 2024-06-27

## 2024-06-27 RX ORDER — PIOGLITAZONEHYDROCHLORIDE 15 MG/1
15 TABLET ORAL DAILY
Qty: 90 TABLET | Refills: 2 | Status: SHIPPED | OUTPATIENT
Start: 2024-06-27

## 2024-06-27 RX ORDER — METFORMIN HYDROCHLORIDE 1000 MG/1
1 TABLET, FILM COATED, EXTENDED RELEASE ORAL
Qty: 180 TABLET | Refills: 2 | Status: SHIPPED | OUTPATIENT
Start: 2024-06-27

## 2024-06-27 RX ORDER — SIMVASTATIN 20 MG
20 TABLET ORAL EVERY EVENING
Qty: 90 TABLET | Refills: 2 | Status: SHIPPED | OUTPATIENT
Start: 2024-06-27

## 2024-06-27 RX ORDER — GLIPIZIDE 10 MG/1
10 TABLET, FILM COATED, EXTENDED RELEASE ORAL DAILY
Qty: 90 TABLET | Refills: 2 | Status: SHIPPED | OUTPATIENT
Start: 2024-06-27

## 2024-06-27 RX ORDER — LANCETS 30 GAUGE
EACH MISCELLANEOUS
Qty: 200 EACH | Refills: 3 | Status: SHIPPED | OUTPATIENT
Start: 2024-06-27

## 2024-06-27 RX ORDER — SEMAGLUTIDE 0.68 MG/ML
0.25 INJECTION, SOLUTION SUBCUTANEOUS
Qty: 9 ML | Refills: 2 | Status: SHIPPED | OUTPATIENT
Start: 2024-06-27

## 2024-06-27 RX ORDER — LISINOPRIL 5 MG/1
5 TABLET ORAL DAILY
Qty: 90 TABLET | Refills: 2 | Status: SHIPPED | OUTPATIENT
Start: 2024-06-27

## 2024-06-27 ASSESSMENT — PATIENT HEALTH QUESTIONNAIRE - PHQ9: CLINICAL INTERPRETATION OF PHQ2 SCORE: 0

## 2024-06-27 NOTE — PROGRESS NOTES
cc:  diabetes    Subjective:     Quirino Barker is a 63 y.o. male presenting for diabetes        History of Present Illness  The patient presents to the office today to follow up with his type 2 diabetes with no long-term use of insulin and hyperlipidemia. He also has hypertension. He is needing medication refills at this time. He does have questions as to why his metformin, which was extended release, was converted to regular metformin. He is also here to discuss his most recent labs. He also complains of skin lesions, but is declining a dermatology referral at this time. His goal is to try and decrease medication if possible.       Review of systems:  See above.   Denies any symptoms unless previously indicated.        Current Outpatient Medications:     Omega 3 340 MG CAPSULE DELAYED RELEASE, Take 500 mg by mouth every day., Disp: , Rfl:     metformin ER modified (GLUMETZA) 1000 MG TABLET SR 24 HR, Take 1 Tablet by mouth 2 (two) times a day., Disp: 180 Tablet, Rfl: 2    Blood Glucose Meter Kit, Test blood sugar as recommended by provider. One Touch Verio Flex or insurance preference blood glucose monitoring kit., Disp: 1 Kit, Rfl: 0    Blood Glucose Test Strips, Use one One Touch Verio Flex or insurance preference strip to test blood sugar twice daily., Disp: 200 Strip, Rfl: 3    Lancets, Use one One Touch Verio Flex lancet or insurance preference to test blood sugar twice daily., Disp: 200 Each, Rfl: 3    Alcohol Swabs, Wipe site with prep pad prior to injection., Disp: 200 Each, Rfl: 3    Semaglutide,0.25 or 0.5MG/DOS, (OZEMPIC, 0.25 OR 0.5 MG/DOSE,) 2 MG/3ML Solution Pen-injector, Inject 0.25 mg under the skin every 7 days., Disp: 9 mL, Rfl: 2    lisinopril (PRINIVIL) 5 MG Tab, Take 1 Tablet by mouth every day., Disp: 90 Tablet, Rfl: 2    simvastatin (ZOCOR) 20 MG Tab, Take 1 Tablet by mouth every evening., Disp: 90 Tablet, Rfl: 2    pioglitazone (ACTOS) 15 MG Tab, Take 1 Tablet by mouth every day., Disp: 90  Tablet, Rfl: 2    glipiZIDE SR (GLUCOTROL) 10 MG TABLET SR 24 HR, Take 1 Tablet by mouth every day., Disp: 90 Tablet, Rfl: 2    triamcinolone acetonide (KENALOG) 0.1 % Cream, APPLY  CREAM EXTERNALLY TO AFFECTED AREA TWICE DAILY, Disp: 30 g, Rfl: 0    aspirin 81 MG tablet, Take 81 mg by mouth every day., Disp: , Rfl:     Multiple Vitamins-Minerals (CENTRUM SILVER ADULT 50+) Tab, Take  by mouth., Disp: , Rfl:     cholecalciferol (VITAMIN D3) 400 UNIT Tab, Take 400 Units by mouth every day. (Patient not taking: Reported on 6/27/2024), Disp: , Rfl:     Allergies, past medical history, past surgical history, family history, social history reviewed and updated    Objective:     Vitals: /76 (BP Location: Left arm, Patient Position: Sitting, BP Cuff Size: Large adult)   Pulse 72   Temp 36.8 °C (98.3 °F) (Temporal)   Resp 18   Ht 1.829 m (6')   Wt 103 kg (227 lb 11.8 oz)   SpO2 99%   BMI 30.89 kg/m²   General: Alert, pleasant, NAD  EYES:   PERRL, EOMI, no icterus or pallor.  Conjunctivae and lids normal.   HENT:  Normocephalic.  External ears normal..  Neck supple.    Respiratory: Normal respiratory effort.    Abdomen: obese  Skin: Warm, dry, no rashes.  4 warts right hand-per student.    Musculoskeletal: Gait is normal.  Moves all extremities well.    Extremities: declined monofilament..   Neurological: No tremors, sensation grossly intact, , CN2-12 intact.  Psych:  Affect/mood is normal, judgement is good, memory is intact, grooming is appropriate.    Physical Exam         Results  Laboratory Studies  A1c is 8.0.      Latest Reference Range & Units 05/23/24 08:04   Sodium 135 - 145 mmol/L 134 (L)   Potassium 3.6 - 5.5 mmol/L 4.9   Chloride 96 - 112 mmol/L 99   Co2 20 - 33 mmol/L 21   Anion Gap 7.0 - 16.0  14.0   Glucose 65 - 99 mg/dL 207 (H)   Bun 8 - 22 mg/dL 16   Creatinine 0.50 - 1.40 mg/dL 0.83   GFR (CKD-EPI) >60 mL/min/1.73 m 2 99   Calcium 8.5 - 10.5 mg/dL 9.8   Correct Calcium 8.5 - 10.5 mg/dL 9.4    AST(SGOT) 12 - 45 U/L 21   ALT(SGPT) 2 - 50 U/L 24   Alkaline Phosphatase 30 - 99 U/L 89   Total Bilirubin 0.1 - 1.5 mg/dL 0.4   Albumin 3.2 - 4.9 g/dL 4.5   Total Protein 6.0 - 8.2 g/dL 7.2   Globulin 1.9 - 3.5 g/dL 2.7   A-G Ratio g/dL 1.7   Cholesterol,Tot 100 - 199 mg/dL 141   Triglycerides 0 - 149 mg/dL 220 (H)   HDL >=40 mg/dL 36 !   LDL <100 mg/dL 61   (L): Data is abnormally low  (H): Data is abnormally high  !: Data is abnormal    A1c 8.0    Assessment/Plan:     Quirino was seen today for follow-up.    Diagnoses and all orders for this visit:    Type 2 diabetes mellitus with other specified complication, without long-term current use of insulin (HCC)  -     pioglitazone (ACTOS) 15 MG Tab; Take 1 Tablet by mouth every day.  -     Lipid Profile; Future  -     Comp Metabolic Panel; Future  -     HEMOGLOBIN A1C; Future    Type 2 diabetes mellitus without complication, without long-term current use of insulin (HCC)  -     glipiZIDE SR (GLUCOTROL) 10 MG TABLET SR 24 HR; Take 1 Tablet by mouth every day.  -     Lipid Profile; Future  -     Comp Metabolic Panel; Future  -     HEMOGLOBIN A1C; Future    Type 2 diabetes mellitus with other specified complication (HCC)  -     POCT A1C  -     Microalbumin Creat Ratio Urine (Clinic Collect); Future  -     metformin ER modified (GLUMETZA) 1000 MG TABLET SR 24 HR; Take 1 Tablet by mouth 2 (two) times a day.  -     Blood Glucose Meter Kit; Test blood sugar as recommended by provider. One Touch Verio Flex or insurance preference blood glucose monitoring kit.  -     Blood Glucose Test Strips; Use one One Touch Verio Flex or insurance preference strip to test blood sugar twice daily.  -     Lancets; Use one One Touch Verio Flex lancet or insurance preference to test blood sugar twice daily.  -     Alcohol Swabs; Wipe site with prep pad prior to injection.  -     Semaglutide,0.25 or 0.5MG/DOS, (OZEMPIC, 0.25 OR 0.5 MG/DOSE,) 2 MG/3ML Solution Pen-injector; Inject 0.25 mg under  the skin every 7 days.    Hyperlipidemia associated with type 2 diabetes mellitus (HCC)  -     simvastatin (ZOCOR) 20 MG Tab; Take 1 Tablet by mouth every evening.    Essential hypertension  -     lisinopril (PRINIVIL) 5 MG Tab; Take 1 Tablet by mouth every day.    Skin lesion        Assessment & Plan  1. Type 2 diabetes without long term use of insulin, but with hyperlipidemia/hypertension.  The patient's laboratory results were reviewed with the patient. The initiation of Ozempic 0.25 mg weekly, to be administered subcutaneously, will be initiated. A follow-up appointment is scheduled for approximately 6 to 8 weeks to reassess the patient's response to the medication. Lab follow-up is scheduled for 3 to 4 months from now. Medication refills have been provided for blood pressure and cholesterol management. Glucometer ordered.      2. Skin lesion.  The patient declined a dermatology referral at this time, but will inform us if he changes his mind.    Return in about 8 weeks (around 8/22/2024), or if symptoms worsen or fail to improve, for follow up ozempic.    Please note that this dictation was created using voice recognition software. I have made every reasonable attempt to correct obvious errors, but expect that there are errors of grammar and possible content that I did not discover before finalizing note.

## 2024-07-28 ENCOUNTER — PATIENT MESSAGE (OUTPATIENT)
Dept: MEDICAL GROUP | Facility: CLINIC | Age: 64
End: 2024-07-28
Payer: COMMERCIAL

## 2024-07-28 DIAGNOSIS — E11.69 TYPE 2 DIABETES MELLITUS WITH OTHER SPECIFIED COMPLICATION, WITHOUT LONG-TERM CURRENT USE OF INSULIN (HCC): ICD-10-CM

## 2024-07-29 RX ORDER — METFORMIN HYDROCHLORIDE 500 MG/1
500 TABLET, EXTENDED RELEASE ORAL 2 TIMES DAILY
Qty: 180 TABLET | Refills: 1 | Status: SHIPPED | OUTPATIENT
Start: 2024-07-29

## 2024-08-22 ENCOUNTER — OFFICE VISIT (OUTPATIENT)
Dept: MEDICAL GROUP | Facility: CLINIC | Age: 64
End: 2024-08-22
Payer: COMMERCIAL

## 2024-08-22 VITALS
RESPIRATION RATE: 20 BRPM | WEIGHT: 228.84 LBS | BODY MASS INDEX: 32.04 KG/M2 | HEIGHT: 71 IN | HEART RATE: 74 BPM | OXYGEN SATURATION: 96 % | SYSTOLIC BLOOD PRESSURE: 128 MMHG | TEMPERATURE: 98.2 F | DIASTOLIC BLOOD PRESSURE: 66 MMHG

## 2024-08-22 DIAGNOSIS — E11.69 HYPERLIPIDEMIA ASSOCIATED WITH TYPE 2 DIABETES MELLITUS (HCC): ICD-10-CM

## 2024-08-22 DIAGNOSIS — Z12.5 PROSTATE CANCER SCREENING: ICD-10-CM

## 2024-08-22 DIAGNOSIS — Z11.4 ENCOUNTER FOR SCREENING FOR HIV: ICD-10-CM

## 2024-08-22 DIAGNOSIS — E78.5 HYPERLIPIDEMIA ASSOCIATED WITH TYPE 2 DIABETES MELLITUS (HCC): ICD-10-CM

## 2024-08-22 DIAGNOSIS — K59.09 OTHER CONSTIPATION: ICD-10-CM

## 2024-08-22 DIAGNOSIS — E11.69 TYPE 2 DIABETES MELLITUS WITH OTHER SPECIFIED COMPLICATION (HCC): ICD-10-CM

## 2024-08-22 PROCEDURE — 99214 OFFICE O/P EST MOD 30 MIN: CPT | Performed by: PHYSICIAN ASSISTANT

## 2024-08-22 PROCEDURE — 3074F SYST BP LT 130 MM HG: CPT | Performed by: PHYSICIAN ASSISTANT

## 2024-08-22 PROCEDURE — 3078F DIAST BP <80 MM HG: CPT | Performed by: PHYSICIAN ASSISTANT

## 2024-08-22 RX ORDER — SEMAGLUTIDE 0.68 MG/ML
0.5 INJECTION, SOLUTION SUBCUTANEOUS
Qty: 9 ML | Refills: 2 | Status: SHIPPED | OUTPATIENT
Start: 2024-08-22

## 2024-08-22 NOTE — PROGRESS NOTES
cc:  diabetes    Subjective:     Quirino Barker is a 64 y.o. male presenting for diabetes        History of Present Illness  The patient is a 64-year-old male who presents for a follow-up of diabetes, specifically regarding his Ozempic medication.    He reports a slight weight gain and constipation, which he finds manageable. He has not been monitoring his blood sugar levels at home. He also mentions that he missed his Ozempic dose by 3 days due to an insurance issue. His wife assists him with medication refills on a monthly basis.    He has a history of constipation, which seems to have slightly worsened recently.    He underwent a retina screen within the past year and has an ultrasound scheduled for next month to monitor his abdominal aortic aneurysm (AAA). He also plans to have his leg circulation checked at that time.    He had laser surgery on his nose and underwent a procedure for narrow angle drainage in his eyes. He typically sees his eye doctor in December, but believes his last visit was in January or February 2024.       Review of systems:  See above.   Denies any symptoms unless previously indicated.        Current Outpatient Medications:     Semaglutide,0.25 or 0.5MG/DOS, (OZEMPIC, 0.25 OR 0.5 MG/DOSE,) 2 MG/3ML Solution Pen-injector, Inject 0.5 mg under the skin every 7 days., Disp: 9 mL, Rfl: 2    metFORMIN ER (GLUCOPHAGE XR) 500 MG TABLET SR 24 HR, Take 1 Tablet by mouth 2 times a day., Disp: 180 Tablet, Rfl: 1    Omega 3 340 MG CAPSULE DELAYED RELEASE, Take 500 mg by mouth every day., Disp: , Rfl:     Blood Glucose Meter Kit, Test blood sugar as recommended by provider. One Touch Verio Flex or insurance preference blood glucose monitoring kit., Disp: 1 Kit, Rfl: 0    Blood Glucose Test Strips, Use one One Touch Verio Flex or insurance preference strip to test blood sugar twice daily., Disp: 200 Strip, Rfl: 3    Lancets, Use one One Touch Verio Flex lancet or insurance preference to test blood sugar  "twice daily., Disp: 200 Each, Rfl: 3    Alcohol Swabs, Wipe site with prep pad prior to injection., Disp: 200 Each, Rfl: 3    lisinopril (PRINIVIL) 5 MG Tab, Take 1 Tablet by mouth every day., Disp: 90 Tablet, Rfl: 2    simvastatin (ZOCOR) 20 MG Tab, Take 1 Tablet by mouth every evening., Disp: 90 Tablet, Rfl: 2    pioglitazone (ACTOS) 15 MG Tab, Take 1 Tablet by mouth every day., Disp: 90 Tablet, Rfl: 2    glipiZIDE SR (GLUCOTROL) 10 MG TABLET SR 24 HR, Take 1 Tablet by mouth every day., Disp: 90 Tablet, Rfl: 2    triamcinolone acetonide (KENALOG) 0.1 % Cream, APPLY  CREAM EXTERNALLY TO AFFECTED AREA TWICE DAILY, Disp: 30 g, Rfl: 0    aspirin 81 MG tablet, Take 81 mg by mouth every day., Disp: , Rfl:     Multiple Vitamins-Minerals (CENTRUM SILVER ADULT 50+) Tab, Take  by mouth., Disp: , Rfl:     cholecalciferol (VITAMIN D3) 400 UNIT Tab, Take 400 Units by mouth every day. (Patient not taking: Reported on 6/27/2024), Disp: , Rfl:     Allergies, past medical history, past surgical history, family history, social history reviewed and updated    Objective:     Vitals: /66   Pulse 74   Temp 36.8 °C (98.2 °F) (Temporal)   Resp 20   Ht 1.803 m (5' 11\")   Wt 104 kg (228 lb 13.4 oz)   SpO2 96%   BMI 31.92 kg/m²   General: Alert, pleasant, NAD  EYES:   PERRL, EOMI, no icterus or pallor.  Conjunctivae and lids normal.   HENT:  Normocephalic.  External ears normal.  Neck supple.    Respiratory: Normal respiratory effort.    Abdomen: obese  Skin: Warm, dry, no rashes.  Musculoskeletal: Gait is normal.  Moves all extremities well.    Extremities: normal range of motion all extremities.   Neurological: No tremors, sensation grossly intact, CN2-12 intact.  Psych:  Affect/mood is normal, judgement is good, memory is intact, grooming is appropriate.      Assessment/Plan:     Quirino was seen today for medication follow-up.    Diagnoses and all orders for this visit:    Type 2 diabetes mellitus with other specified " complication (HCC)  -     Semaglutide,0.25 or 0.5MG/DOS, (OZEMPIC, 0.25 OR 0.5 MG/DOSE,) 2 MG/3ML Solution Pen-injector; Inject 0.5 mg under the skin every 7 days.  -     Lipid Profile; Future  -     Comp Metabolic Panel; Future  -     HEMOGLOBIN A1C; Future    Other constipation    Hyperlipidemia associated with type 2 diabetes mellitus (HCC)  -     Lipid Profile; Future  -     Comp Metabolic Panel; Future    Prostate cancer screening  -     PROSTATE SPECIFIC AG SCREENING; Future    Encounter for screening for HIV  -     HIV AG/AB COMBO ASSAY DIAGNOSTIC; Future        Assessment & Plan  1. Type 2 Diabetes Mellitus.  He reports tolerable constipation potentially related to Ozempic. The Ozempic dose will be increased to 0.5 mg. He is advised to monitor for worsening constipation and report if it becomes intolerable. Labs, including a metabolic panel and cholesterol, are ordered for early October 2024 to assess diabetes control and medication efficacy.    2. Constipation.  Constipation is noted as a side effect of Ozempic but is currently tolerable. He is advised to monitor symptoms and report if they worsen. No additional treatment for constipation is prescribed at this time.    3. Hyperlipidemia.  Repeat labs, including cholesterol levels, are scheduled for early October 2024 to monitor lipid control.    4. Prostate Cancer Screening.  A PSA test is ordered to be done with the next set of labs in early October 2024.    5. HIV Screening.  An HIV screening test is ordered to be done with the next set of labs in early October 2024.    6. Health Maintenance.  A request has been made to obtain records of his recent retina evaluation from Dr. Yue Hernandez at [facility].      No follow-ups on file.    Please note that this dictation was created using voice recognition software. I have made every reasonable attempt to correct obvious errors, but expect that there are errors of grammar and possible content that I  did not discover before finalizing note.

## 2024-08-22 NOTE — LETTER
[employer] [facility]  Josselyn Batista P.A.-C.  [address] [address] [address]  [facility] [address]  Fax: [phone]   Authorization for Release/Disclosure of   Protected Health Information   Name: MOE BARKER : 1960 SSN: [id number]   Address: [address]  [facility] [address] Phone:    [phone] (home)    I authorize the entity listed below to release/disclose the PHI below to:   [facility]/Josselyn Batista P.A.-C. and Josselyn Batista P.A.-C.   Provider or Entity Name:  [facility].  Dr. Yue Hernandez.       Address   City, [facility], [facility]   Phone:      Fax:     Reason for request: continuity of care   Information to be released:    [  ] LAST COLONOSCOPY,  including any PATH REPORT and follow-up  [  ] LAST FIT/COLOGUARD RESULT [  ] LAST DEXA  [  ] LAST MAMMOGRAM  [  ] LAST PAP  [  ] LAST LABS [  ] RETINA EXAM REPORT  [  ] IMMUNIZATION RECORDS  [  x] Release all info      [  ] Check here and initial the line next to each item to release ALL health information INCLUDING  _____ Care and treatment for drug and / or alcohol abuse  _____ HIV testing, infection status, or AIDS  _____ Genetic Testing    DATES OF SERVICE OR TIME PERIOD TO BE DISCLOSED: _____________  I understand and acknowledge that:  * This Authorization may be revoked at any time by you in writing, except if your health information has already been used or disclosed.  * Your health information that will be used or disclosed as a result of you signing this authorization could be re-disclosed by the recipient. If this occurs, your re-disclosed health information may no longer be protected by State or Federal laws.  * You may refuse to sign this Authorization. Your refusal will not affect your ability to obtain treatment.  * This Authorization becomes effective upon signing and will  on (date) __________.      If no date is indicated, this Authorization will  one (1) year from the signature date.    Name: Moe Barker  Signature:  Date:   8/22/2024     PLEASE FAX REQUESTED RECORDS BACK TO: [phone]

## 2024-08-22 NOTE — LETTER
[employer] [facility]  Josselyn Batista P.A.-C.  [address]  [facility] [address]  Fax: [phone]   Authorization for Release/Disclosure of   Protected Health Information   Name: MOE BARKER : 1960 SSN: [id number]   Address: [address]  [facility] [address] Phone:    [phone] (home)    I authorize the entity listed below to release/disclose the PHI below to:   [facility]/Josselyn Batista P.A.-C. and Josselyn Batista P.A.-C.   Provider [facility] Entity [facility]     Address   City, State, Zip   Phone:      Fax:     Reason for request: continuity of care   Information to be released:    [  ] LAST COLONOSCOPY,  including any PATH REPORT and follow-up  [  ] LAST FIT/COLOGUARD RESULT [  ] LAST DEXA  [  ] LAST MAMMOGRAM  [  ] LAST PAP  [  ] LAST LABS [  ] RETINA EXAM REPORT  [  ] IMMUNIZATION RECORDS  [ x ] Release all info      [  ] Check here and initial the line next to each item to release ALL health information INCLUDING  _____ Care and treatment for drug and / or alcohol abuse  _____ HIV testing, infection status, or AIDS  _____ Genetic Testing    DATES OF SERVICE OR TIME PERIOD TO BE DISCLOSED: _____________  I understand and acknowledge that:  * This Authorization may be revoked at any time by you in writing, except if your health information has already been used or disclosed.  * Your health information that will be used or disclosed as a result of you signing this authorization could be re-disclosed by the recipient. If this occurs, your re-disclosed health information may no longer be protected by State or Federal laws.  * You may refuse to sign this Authorization. Your refusal will not affect your ability to obtain treatment.  * This Authorization becomes effective upon signing and will  on (date) __________.      If no date is indicated, this Authorization will  one (1) year from the signature date.    Name: Moe Barker  Signature: Date:   2024      PLEASE FAX REQUESTED RECORDS BACK TO: [phone]

## 2024-11-21 ENCOUNTER — HOSPITAL ENCOUNTER (OUTPATIENT)
Facility: MEDICAL CENTER | Age: 64
End: 2024-11-21
Attending: PHYSICIAN ASSISTANT
Payer: COMMERCIAL

## 2024-11-21 ENCOUNTER — NON-PROVIDER VISIT (OUTPATIENT)
Dept: MEDICAL GROUP | Facility: CLINIC | Age: 64
End: 2024-11-21
Payer: COMMERCIAL

## 2024-11-21 LAB
ALBUMIN SERPL BCP-MCNC: 4.5 G/DL (ref 3.2–4.9)
ALBUMIN/GLOB SERPL: 1.6 G/DL
ALP SERPL-CCNC: 97 U/L (ref 30–99)
ALT SERPL-CCNC: 19 U/L (ref 2–50)
ANION GAP SERPL CALC-SCNC: 10 MMOL/L (ref 7–16)
AST SERPL-CCNC: 21 U/L (ref 12–45)
BILIRUB SERPL-MCNC: 0.3 MG/DL (ref 0.1–1.5)
BUN SERPL-MCNC: 16 MG/DL (ref 8–22)
CALCIUM ALBUM COR SERPL-MCNC: 9.1 MG/DL (ref 8.5–10.5)
CALCIUM SERPL-MCNC: 9.5 MG/DL (ref 8.5–10.5)
CHLORIDE SERPL-SCNC: 103 MMOL/L (ref 96–112)
CHOLEST SERPL-MCNC: 145 MG/DL (ref 100–199)
CO2 SERPL-SCNC: 23 MMOL/L (ref 20–33)
CREAT SERPL-MCNC: 0.88 MG/DL (ref 0.5–1.4)
EST. AVERAGE GLUCOSE BLD GHB EST-MCNC: 157 MG/DL
GFR SERPLBLD CREATININE-BSD FMLA CKD-EPI: 97 ML/MIN/1.73 M 2
GLOBULIN SER CALC-MCNC: 2.8 G/DL (ref 1.9–3.5)
GLUCOSE SERPL-MCNC: 176 MG/DL (ref 65–99)
HBA1C MFR BLD: 7.1 % (ref 4–5.6)
HDLC SERPL-MCNC: 38 MG/DL
HIV 1+2 AB+HIV1 P24 AG SERPL QL IA: NORMAL
LDLC SERPL CALC-MCNC: 63 MG/DL
POTASSIUM SERPL-SCNC: 4.9 MMOL/L (ref 3.6–5.5)
PROT SERPL-MCNC: 7.3 G/DL (ref 6–8.2)
PSA SERPL DL<=0.01 NG/ML-MCNC: 0.99 NG/ML (ref 0–4)
SODIUM SERPL-SCNC: 136 MMOL/L (ref 135–145)
TRIGL SERPL-MCNC: 221 MG/DL (ref 0–149)

## 2024-11-21 PROCEDURE — 80061 LIPID PANEL: CPT

## 2024-11-21 PROCEDURE — 87389 HIV-1 AG W/HIV-1&-2 AB AG IA: CPT

## 2024-11-21 PROCEDURE — 36415 COLL VENOUS BLD VENIPUNCTURE: CPT | Performed by: PHYSICIAN ASSISTANT

## 2024-11-21 PROCEDURE — 80053 COMPREHEN METABOLIC PANEL: CPT

## 2024-11-21 PROCEDURE — 99000 SPECIMEN HANDLING OFFICE-LAB: CPT | Performed by: PHYSICIAN ASSISTANT

## 2024-11-21 PROCEDURE — 83036 HEMOGLOBIN GLYCOSYLATED A1C: CPT

## 2024-11-21 PROCEDURE — 84153 ASSAY OF PSA TOTAL: CPT

## 2024-11-21 NOTE — PROGRESS NOTES
Quirino Barker is a 64 y.o. male here for a non-provider visit for a lab draw on 11/21/2024 at 7:48 AM.    Procedure performed:  Venipuncture     Anatomical site:  Right Antecubital Area    Equipment used:  25 g butterfly     Labs drawn:  A1c, Comp Metabolic Panel , Lipid Profile, PSA, and HIV Ag/Ab Combo    Ordering provider:  Josselyn Batista    Lab draw completed by:  Jenna Santa, Salvatore Ass't

## 2025-01-23 ENCOUNTER — OFFICE VISIT (OUTPATIENT)
Dept: MEDICAL GROUP | Facility: CLINIC | Age: 65
End: 2025-01-23
Payer: COMMERCIAL

## 2025-01-23 VITALS
HEART RATE: 79 BPM | SYSTOLIC BLOOD PRESSURE: 128 MMHG | OXYGEN SATURATION: 97 % | TEMPERATURE: 97.6 F | WEIGHT: 229.72 LBS | RESPIRATION RATE: 16 BRPM | BODY MASS INDEX: 31.11 KG/M2 | DIASTOLIC BLOOD PRESSURE: 78 MMHG | HEIGHT: 72 IN

## 2025-01-23 DIAGNOSIS — E11.69 TYPE 2 DIABETES MELLITUS WITH OTHER SPECIFIED COMPLICATION, WITHOUT LONG-TERM CURRENT USE OF INSULIN (HCC): ICD-10-CM

## 2025-01-23 DIAGNOSIS — I10 ESSENTIAL HYPERTENSION: ICD-10-CM

## 2025-01-23 DIAGNOSIS — E11.69 HYPERLIPIDEMIA ASSOCIATED WITH TYPE 2 DIABETES MELLITUS (HCC): ICD-10-CM

## 2025-01-23 DIAGNOSIS — R20.2 PARESTHESIA: ICD-10-CM

## 2025-01-23 DIAGNOSIS — L20.84 INTRINSIC ECZEMA: ICD-10-CM

## 2025-01-23 DIAGNOSIS — E66.811 OBESITY, CLASS I, BMI 30-34.9: ICD-10-CM

## 2025-01-23 DIAGNOSIS — E78.5 HYPERLIPIDEMIA ASSOCIATED WITH TYPE 2 DIABETES MELLITUS (HCC): ICD-10-CM

## 2025-01-23 DIAGNOSIS — L30.9 ECZEMA, UNSPECIFIED TYPE: ICD-10-CM

## 2025-01-23 DIAGNOSIS — M54.2 NECK PAIN: ICD-10-CM

## 2025-01-23 DIAGNOSIS — Z98.890 HISTORY OF AAA (ABDOMINAL AORTIC ANEURYSM) REPAIR: ICD-10-CM

## 2025-01-23 PROCEDURE — 3078F DIAST BP <80 MM HG: CPT | Performed by: PHYSICIAN ASSISTANT

## 2025-01-23 PROCEDURE — 99214 OFFICE O/P EST MOD 30 MIN: CPT | Performed by: PHYSICIAN ASSISTANT

## 2025-01-23 PROCEDURE — 3074F SYST BP LT 130 MM HG: CPT | Performed by: PHYSICIAN ASSISTANT

## 2025-01-23 RX ORDER — GLIPIZIDE 10 MG/1
10 TABLET, FILM COATED, EXTENDED RELEASE ORAL DAILY
Qty: 90 TABLET | Refills: 2 | Status: SHIPPED | OUTPATIENT
Start: 2025-01-23

## 2025-01-23 RX ORDER — PIOGLITAZONE 15 MG/1
15 TABLET ORAL DAILY
Qty: 90 TABLET | Refills: 2 | Status: SHIPPED | OUTPATIENT
Start: 2025-01-23

## 2025-01-23 RX ORDER — SEMAGLUTIDE 1.34 MG/ML
1 INJECTION, SOLUTION SUBCUTANEOUS
Qty: 9 ML | Refills: 2 | Status: SHIPPED | OUTPATIENT
Start: 2025-01-23

## 2025-01-23 RX ORDER — LISINOPRIL 5 MG/1
5 TABLET ORAL DAILY
Qty: 90 TABLET | Refills: 2 | Status: SHIPPED | OUTPATIENT
Start: 2025-01-23

## 2025-01-23 RX ORDER — SIMVASTATIN 20 MG
20 TABLET ORAL EVERY EVENING
Qty: 90 TABLET | Refills: 2 | Status: SHIPPED | OUTPATIENT
Start: 2025-01-23

## 2025-01-23 RX ORDER — TRIAMCINOLONE ACETONIDE 1 MG/G
CREAM TOPICAL
Qty: 30 G | Refills: 0 | Status: SHIPPED | OUTPATIENT
Start: 2025-01-23

## 2025-01-23 ASSESSMENT — PATIENT HEALTH QUESTIONNAIRE - PHQ9: CLINICAL INTERPRETATION OF PHQ2 SCORE: 0

## 2025-01-23 NOTE — LETTER
[facility]  Josselyn Batista P.A.-C.  [address]  [facility] [address]  Fax: [phone]   Authorization for Release/Disclosure of   Protected Health Information   Name: MOE BARKER : 1960 SSN: [id number]   Address: [address]  [facility] [address] Phone:    [phone] (home)    I authorize the entity listed below to release/disclose the PHI below to:   [facility]/Josselyn Batista P.A.-C. and Josselyn Batista P.A.-C.   Provider or Entity Name:  Dr. Grossman     Address   City, [facility], [facility]   Phone:      Fax:     Reason for request: continuity of care   Information to be released:    [  ] LAST COLONOSCOPY,  including any PATH REPORT and follow-up  [  ] LAST FIT/COLOGUARD RESULT [  ] LAST DEXA  [  ] LAST MAMMOGRAM  [  ] LAST PAP  [  ] LAST LABS [  ] RETINA EXAM REPORT  [  ] IMMUNIZATION RECORDS  [ x ] Release all info      [  ] Check here and initial the line next to each item to release ALL health information INCLUDING  _____ Care and treatment for drug and / or alcohol abuse  _____ HIV testing, infection status, or AIDS  _____ Genetic Testing    DATES OF SERVICE OR TIME PERIOD TO BE DISCLOSED: _____________  I understand and acknowledge that:  * This Authorization may be revoked at any time by you in writing, except if your health information has already been used or disclosed.  * Your health information that will be used or disclosed as a result of you signing this authorization could be re-disclosed by the recipient. If this occurs, your re-disclosed health information may no longer be protected by State or Federal laws.  * You may refuse to sign this Authorization. Your refusal will not affect your ability to obtain treatment.  * This Authorization becomes effective upon signing and will  on (date) __________.      If no date is indicated, this Authorization will  one (1) year from the signature date.    Name: Moe Barker  Signature: Date:   2025     PLEASE FAX  REQUESTED RECORDS BACK TO: [phone]

## 2025-01-23 NOTE — PROGRESS NOTES
cc:  diabetes    Subjective:     Quirino Barker is a 64 y.o. male presenting for diabetes        History of Present Illness  The patient is a 64-year-old male who presents to the office today to follow up with his most recent test results. He is a type 2 diabetic with hypertension, obesity with a BMI of 31.16, and hyperlipidemia associated with type 2 diabetes. He has a history of an abdominal aortic aneurysm (AAA) repair.    He reports that Ozempic has been effective in reducing his appetite, leading to decreased food intake. He expresses a desire to discontinue metformin, citing concerns about its long-term effects. He has been on this medication for an extended period. He currently takes Ozempic 0.5 mg but administers it in two separate doses of 0.25 mg each due to discomfort and bruising when the full dose is administered at once. He alternates the injection site between the left and right side of his abdomen. He monitors his blood glucose levels at home, which typically range from 204 to 217 upon waking before eating.    He experiences intermittent tingling in his fingers, predominantly on the left side. He suspects this may be due to a nerve issue in his neck or shoulder. He also reports occasional numbness in his right hand, extending down the arm and into the fingertips. These symptoms are transient and often resolve with neck adjustment. He believes his neck has been particularly tight recently and expresses interest in chiropractic treatment. He is unable to undergo MRI due to the presence of implants.    He has a history of an abdominal aortic aneurysm (AAA) repair. He undergoes annual ultrasounds, as recommended by Dr. Parker, with the most recent one conducted a few months prior.    He reports experiencing dry skin on his legs, while his arms remain moist. He requests a refill of his triamcinolone cream prescription, although he still has a full tube at home.    MEDICATIONS  Current: Metformin, Ozempic,  glipizide, pioglitazone, simvastatin, lisinopril, triamcinolone cream.       Review of systems:  See above.   Denies any symptoms unless previously indicated.        Current Outpatient Medications:     Semaglutide, 1 MG/DOSE, (OZEMPIC, 1 MG/DOSE,) 4 MG/3ML Solution Pen-injector, Inject 1 mg under the skin every 7 days., Disp: 9 mL, Rfl: 2    lisinopril (PRINIVIL) 5 MG Tab, Take 1 Tablet by mouth every day., Disp: 90 Tablet, Rfl: 2    simvastatin (ZOCOR) 20 MG Tab, Take 1 Tablet by mouth every evening., Disp: 90 Tablet, Rfl: 2    pioglitazone (ACTOS) 15 MG Tab, Take 1 Tablet by mouth every day., Disp: 90 Tablet, Rfl: 2    glipiZIDE SR (GLUCOTROL) 10 MG TABLET SR 24 HR, Take 1 Tablet by mouth every day., Disp: 90 Tablet, Rfl: 2    triamcinolone acetonide (KENALOG) 0.1 % Cream, Apply to affected are twice a day as needed no more than 10 days., Disp: 30 g, Rfl: 0    Omega 3 340 MG CAPSULE DELAYED RELEASE, Take 500 mg by mouth every day., Disp: , Rfl:     Blood Glucose Meter Kit, Test blood sugar as recommended by provider. One Touch Verio Flex or insurance preference blood glucose monitoring kit., Disp: 1 Kit, Rfl: 0    Blood Glucose Test Strips, Use one One Touch Verio Flex or insurance preference strip to test blood sugar twice daily., Disp: 200 Strip, Rfl: 3    Lancets, Use one One Touch Verio Flex lancet or insurance preference to test blood sugar twice daily., Disp: 200 Each, Rfl: 3    aspirin 81 MG tablet, Take 81 mg by mouth every day., Disp: , Rfl:     Multiple Vitamins-Minerals (CENTRUM SILVER ADULT 50+) Tab, Take  by mouth., Disp: , Rfl:     Alcohol Swabs, Wipe site with prep pad prior to injection., Disp: 200 Each, Rfl: 3    cholecalciferol (VITAMIN D3) 400 UNIT Tab, Take 400 Units by mouth every day. (Patient not taking: Reported on 1/23/2025), Disp: , Rfl:     Allergies, past medical history, past surgical history, family history, social history reviewed and updated    Objective:     Vitals: /78 (BP  Location: Left arm, Patient Position: Sitting, BP Cuff Size: Adult)   Pulse 79   Temp 36.4 °C (97.6 °F) (Temporal)   Resp 16   Ht 1.829 m (6')   Wt 104 kg (229 lb 11.5 oz)   SpO2 97%   BMI 31.16 kg/m²   General: Alert, pleasant, NAD  EYES:   PERRL, EOMI, no icterus or pallor.  Conjunctivae and lids normal.   HENT:  Normocephalic.  External ears normal.  Neck supple.    Respiratory: Normal respiratory effort.   Abdomen: obese  Skin: Warm, dry, no rashes.  Musculoskeletal: Gait is normal.  Moves all extremities well.    Neurological: No tremors, sensation grossly intact, CN2-12 intact.  Psych:  Affect/mood is normal, judgement is good, memory is intact, grooming is appropriate.    Physical Exam  Vital Signs  BMI is 31.16.       Results  Laboratory Studies  A1c improved from 8.0 to 7.1. LDL cholesterol is at goal at 63. Triglycerides are still elevated. PSA levels are normal. Kidney filtration is normal.      Latest Reference Range & Units 11/21/24 07:35   Sodium 135 - 145 mmol/L 136   Potassium 3.6 - 5.5 mmol/L 4.9   Chloride 96 - 112 mmol/L 103   Co2 20 - 33 mmol/L 23   Anion Gap 7.0 - 16.0  10.0   Glucose 65 - 99 mg/dL 176 (H)   Bun 8 - 22 mg/dL 16   Creatinine 0.50 - 1.40 mg/dL 0.88   GFR (CKD-EPI) >60 mL/min/1.73 m 2 97   Calcium 8.5 - 10.5 mg/dL 9.5   Correct Calcium 8.5 - 10.5 mg/dL 9.1   AST(SGOT) 12 - 45 U/L 21   ALT(SGPT) 2 - 50 U/L 19   Alkaline Phosphatase 30 - 99 U/L 97   Total Bilirubin 0.1 - 1.5 mg/dL 0.3   Albumin 3.2 - 4.9 g/dL 4.5   Total Protein 6.0 - 8.2 g/dL 7.3   Globulin 1.9 - 3.5 g/dL 2.8   A-G Ratio g/dL 1.6   Glycohemoglobin 4.0 - 5.6 % 7.1 (H)   Estim. Avg Glu mg/dL 157   Cholesterol,Tot 100 - 199 mg/dL 145   Triglycerides 0 - 149 mg/dL 221 (H)   HDL >=40 mg/dL 38 !   LDL <100 mg/dL 63   Prostatic Specific Antigen Tot 0.00 - 4.00 ng/mL 0.99   HIV Ag/Ab Combo Assay Non Reactive  Non-Reactive   (H): Data is abnormally high  !: Data is abnormal    Assessment/Plan:     Tod was seen today  for lab results.    Diagnoses and all orders for this visit:    Type 2 diabetes mellitus with other specified complication, without long-term current use of insulin (HCC)  -     Semaglutide, 1 MG/DOSE, (OZEMPIC, 1 MG/DOSE,) 4 MG/3ML Solution Pen-injector; Inject 1 mg under the skin every 7 days.  -     pioglitazone (ACTOS) 15 MG Tab; Take 1 Tablet by mouth every day.  -     glipiZIDE SR (GLUCOTROL) 10 MG TABLET SR 24 HR; Take 1 Tablet by mouth every day.  -     Lipid Profile; Future  -     Comp Metabolic Panel; Future  -     HEMOGLOBIN A1C; Future    Essential hypertension  -     lisinopril (PRINIVIL) 5 MG Tab; Take 1 Tablet by mouth every day.  -     Lipid Profile; Future  -     Comp Metabolic Panel; Future    Obesity, Class I, BMI 30-34.9  -     Lipid Profile; Future  -     Comp Metabolic Panel; Future  -     HEMOGLOBIN A1C; Future    Hyperlipidemia associated with type 2 diabetes mellitus (HCC)  -     simvastatin (ZOCOR) 20 MG Tab; Take 1 Tablet by mouth every evening.  -     Lipid Profile; Future  -     Comp Metabolic Panel; Future    History of AAA (abdominal aortic aneurysm) repair    Eczema, unspecified type  -     triamcinolone acetonide (KENALOG) 0.1 % Cream; Apply to affected are twice a day as needed no more than 10 days.    Intrinsic eczema    Paresthesia  -     Referral to Chiropractic  -     DX-CERVICAL SPINE-2 OR 3 VIEWS; Future  -     DX-SHOULDER 2+ LEFT; Future  -     DX-SHOULDER 2+ RIGHT; Future    Neck pain  -     Referral to Chiropractic  -     DX-CERVICAL SPINE-2 OR 3 VIEWS; Future  -     DX-SHOULDER 2+ LEFT; Future  -     DX-SHOULDER 2+ RIGHT; Future        Assessment & Plan  1. Type 2 diabetes mellitus.  His condition has shown improvement but remains uncontrolled. He has expressed a desire to increase the Ozempic dosage and discontinue metformin. The Ozempic dosage will be increased to 1 mg, and metformin will be discontinued. The current regimen of Actos and glipizide will be maintained.  Repeat labs will be conducted in 4 months to reassess A1c and blood sugar levels due to the medication adjustments. If blood sugar levels continue to improve, glipizide and Actos may be discontinued in the future.    2. Hypertension.  His condition is stable, as indicated by the lab results. Continue current management and monitor with repeat labs in 4 months. Lisinopril prescription has been refilled.    3. Obesity.  His condition is stable, as indicated by the lab results. Continue current management and monitor with repeat labs in 4 months.    4. Hyperlipidemia.  His condition is stable, as indicated by the lab results. Cholesterol levels are at goal with LDL at 63, but triglycerides are still elevated. Continue current management and monitor with repeat labs in 4 months. Simvastatin prescription has been refilled. Increasing the Ozempic dosage may help improve triglyceride levels.    5. History of abdominal aortic aneurysm (AAA) repair.  Records have been requested from Dr. Parker to ensure continuity of care. He undergoes annual ultrasounds to monitor the condition.    6. Eczema.  His condition is not controlled. A prescription for triamcinolone cream will be refilled to manage the symptoms.    7. Paresthesias and neck pain.  X-rays of the neck and bilateral shoulders will be ordered. He has expressed a preference for chiropractic treatment, and a referral will be submitted accordingly. If symptoms persist, further steps such as physical therapy or a CT scan may be considered.    Follow-up  The patient will follow up in 4 months with repeat labs.    PROCEDURE  The patient has a history of an abdominal aortic aneurysm (AAA) repair.    Return in about 4 months (around 5/23/2025) for follow up  labs..    Please note that this dictation was created using voice recognition software. I have made every reasonable attempt to correct obvious errors, but expect that there are errors of grammar and possible content that I  did not discover before finalizing note.

## 2025-02-07 ENCOUNTER — APPOINTMENT (OUTPATIENT)
Dept: RADIOLOGY | Facility: IMAGING CENTER | Age: 65
End: 2025-02-07
Attending: PHYSICIAN ASSISTANT
Payer: COMMERCIAL

## 2025-02-07 ENCOUNTER — NON-PROVIDER VISIT (OUTPATIENT)
Dept: URGENT CARE | Facility: CLINIC | Age: 65
End: 2025-02-07
Payer: COMMERCIAL

## 2025-02-07 DIAGNOSIS — R20.2 PARESTHESIA: ICD-10-CM

## 2025-02-07 DIAGNOSIS — M54.2 NECK PAIN: ICD-10-CM

## 2025-02-07 PROCEDURE — 73030 X-RAY EXAM OF SHOULDER: CPT | Mod: TC,RT | Performed by: NURSE PRACTITIONER

## 2025-02-07 PROCEDURE — 73030 X-RAY EXAM OF SHOULDER: CPT | Mod: TC,LT | Performed by: NURSE PRACTITIONER

## 2025-02-07 PROCEDURE — 72040 X-RAY EXAM NECK SPINE 2-3 VW: CPT | Mod: TC | Performed by: NURSE PRACTITIONER

## 2025-05-15 ENCOUNTER — APPOINTMENT (OUTPATIENT)
Dept: MEDICAL GROUP | Facility: CLINIC | Age: 65
End: 2025-05-15
Payer: COMMERCIAL

## 2025-05-29 ENCOUNTER — HOSPITAL ENCOUNTER (OUTPATIENT)
Facility: MEDICAL CENTER | Age: 65
End: 2025-05-29
Attending: PHYSICIAN ASSISTANT
Payer: COMMERCIAL

## 2025-05-29 ENCOUNTER — NON-PROVIDER VISIT (OUTPATIENT)
Dept: MEDICAL GROUP | Facility: CLINIC | Age: 65
End: 2025-05-29
Payer: COMMERCIAL

## 2025-05-29 DIAGNOSIS — I10 ESSENTIAL HYPERTENSION: ICD-10-CM

## 2025-05-29 DIAGNOSIS — E11.69 HYPERLIPIDEMIA ASSOCIATED WITH TYPE 2 DIABETES MELLITUS (HCC): ICD-10-CM

## 2025-05-29 DIAGNOSIS — E66.811 OBESITY, CLASS I, BMI 30-34.9: ICD-10-CM

## 2025-05-29 DIAGNOSIS — Z11.4 ENCOUNTER FOR SCREENING FOR HIV: ICD-10-CM

## 2025-05-29 DIAGNOSIS — E78.5 HYPERLIPIDEMIA ASSOCIATED WITH TYPE 2 DIABETES MELLITUS (HCC): ICD-10-CM

## 2025-05-29 DIAGNOSIS — E11.69 TYPE 2 DIABETES MELLITUS WITH OTHER SPECIFIED COMPLICATION, WITHOUT LONG-TERM CURRENT USE OF INSULIN (HCC): ICD-10-CM

## 2025-05-29 LAB
ALBUMIN SERPL BCP-MCNC: 4.4 G/DL (ref 3.2–4.9)
ALBUMIN/GLOB SERPL: 1.6 G/DL
ALP SERPL-CCNC: 96 U/L (ref 30–99)
ALT SERPL-CCNC: 28 U/L (ref 2–50)
ANION GAP SERPL CALC-SCNC: 13 MMOL/L (ref 7–16)
AST SERPL-CCNC: 20 U/L (ref 12–45)
BILIRUB SERPL-MCNC: 0.3 MG/DL (ref 0.1–1.5)
BUN SERPL-MCNC: 17 MG/DL (ref 8–22)
CALCIUM ALBUM COR SERPL-MCNC: 9.4 MG/DL (ref 8.5–10.5)
CALCIUM SERPL-MCNC: 9.7 MG/DL (ref 8.5–10.5)
CHLORIDE SERPL-SCNC: 101 MMOL/L (ref 96–112)
CHOLEST SERPL-MCNC: 158 MG/DL (ref 100–199)
CO2 SERPL-SCNC: 21 MMOL/L (ref 20–33)
CREAT SERPL-MCNC: 0.9 MG/DL (ref 0.5–1.4)
EST. AVERAGE GLUCOSE BLD GHB EST-MCNC: 223 MG/DL
GFR SERPLBLD CREATININE-BSD FMLA CKD-EPI: 96 ML/MIN/1.73 M 2
GLOBULIN SER CALC-MCNC: 2.8 G/DL (ref 1.9–3.5)
GLUCOSE SERPL-MCNC: 223 MG/DL (ref 65–99)
HBA1C MFR BLD: 9.4 % (ref 4–5.6)
HDLC SERPL-MCNC: 38 MG/DL
HIV 1+2 AB+HIV1 P24 AG SERPL QL IA: NORMAL
LDLC SERPL CALC-MCNC: 66 MG/DL
POTASSIUM SERPL-SCNC: 4.9 MMOL/L (ref 3.6–5.5)
PROT SERPL-MCNC: 7.2 G/DL (ref 6–8.2)
SODIUM SERPL-SCNC: 135 MMOL/L (ref 135–145)
TRIGL SERPL-MCNC: 270 MG/DL (ref 0–149)

## 2025-05-29 PROCEDURE — 80061 LIPID PANEL: CPT

## 2025-05-29 PROCEDURE — 87389 HIV-1 AG W/HIV-1&-2 AB AG IA: CPT

## 2025-05-29 PROCEDURE — 83036 HEMOGLOBIN GLYCOSYLATED A1C: CPT

## 2025-05-29 PROCEDURE — 80053 COMPREHEN METABOLIC PANEL: CPT

## 2025-05-29 NOTE — PROGRESS NOTES
Quirino Barker is a 64 y.o. male here for a non-provider visit for a lab draw on 5/29/2025 at 9:10 AM.    Procedure performed:  Venipuncture     Anatomical site:  Left Antecubital Area    Equipment used:  21 g vacutainer     Labs drawn:  A1c, Comp Metabolic Panel , and Lipid Profile    Ordering provider:  Josselyn Batista PAC     Lab draw completed by:  Salvatore Miner Ass't

## 2025-06-02 ENCOUNTER — RESULTS FOLLOW-UP (OUTPATIENT)
Dept: MEDICAL GROUP | Facility: CLINIC | Age: 65
End: 2025-06-02

## 2025-07-10 ENCOUNTER — APPOINTMENT (OUTPATIENT)
Dept: MEDICAL GROUP | Facility: CLINIC | Age: 65
End: 2025-07-10
Payer: COMMERCIAL

## 2025-07-10 ENCOUNTER — HOSPITAL ENCOUNTER (OUTPATIENT)
Facility: MEDICAL CENTER | Age: 65
End: 2025-07-10
Attending: PHYSICIAN ASSISTANT
Payer: COMMERCIAL

## 2025-07-10 VITALS
RESPIRATION RATE: 18 BRPM | SYSTOLIC BLOOD PRESSURE: 142 MMHG | DIASTOLIC BLOOD PRESSURE: 64 MMHG | HEIGHT: 71 IN | HEART RATE: 86 BPM | OXYGEN SATURATION: 95 % | TEMPERATURE: 97.8 F | WEIGHT: 231.26 LBS | BODY MASS INDEX: 32.38 KG/M2

## 2025-07-10 DIAGNOSIS — I10 ESSENTIAL HYPERTENSION: ICD-10-CM

## 2025-07-10 DIAGNOSIS — E66.9 OBESITY (BMI 30-39.9): ICD-10-CM

## 2025-07-10 DIAGNOSIS — E11.69 TYPE 2 DIABETES MELLITUS WITH OTHER SPECIFIED COMPLICATION (HCC): ICD-10-CM

## 2025-07-10 DIAGNOSIS — E11.69 TYPE 2 DIABETES MELLITUS WITH OTHER SPECIFIED COMPLICATION (HCC): Primary | ICD-10-CM

## 2025-07-10 DIAGNOSIS — E78.5 HYPERLIPIDEMIA ASSOCIATED WITH TYPE 2 DIABETES MELLITUS (HCC): ICD-10-CM

## 2025-07-10 DIAGNOSIS — E11.69 HYPERLIPIDEMIA ASSOCIATED WITH TYPE 2 DIABETES MELLITUS (HCC): ICD-10-CM

## 2025-07-10 PROCEDURE — 82043 UR ALBUMIN QUANTITATIVE: CPT

## 2025-07-10 PROCEDURE — 82570 ASSAY OF URINE CREATININE: CPT

## 2025-07-10 PROCEDURE — 3078F DIAST BP <80 MM HG: CPT | Performed by: PHYSICIAN ASSISTANT

## 2025-07-10 PROCEDURE — 99214 OFFICE O/P EST MOD 30 MIN: CPT | Performed by: PHYSICIAN ASSISTANT

## 2025-07-10 PROCEDURE — 3077F SYST BP >= 140 MM HG: CPT | Performed by: PHYSICIAN ASSISTANT

## 2025-07-10 RX ORDER — LISINOPRIL 10 MG/1
10 TABLET ORAL DAILY
Qty: 100 TABLET | Refills: 3 | Status: SHIPPED | OUTPATIENT
Start: 2025-07-10 | End: 2026-08-14

## 2025-07-10 RX ORDER — PIOGLITAZONE 30 MG/1
30 TABLET ORAL DAILY
Qty: 100 TABLET | Refills: 3 | Status: SHIPPED | OUTPATIENT
Start: 2025-07-10 | End: 2026-08-14

## 2025-07-10 RX ORDER — ROSUVASTATIN CALCIUM 10 MG/1
10 TABLET, COATED ORAL EVERY EVENING
Qty: 100 TABLET | Refills: 3 | Status: SHIPPED | OUTPATIENT
Start: 2025-07-10 | End: 2026-08-14

## 2025-07-10 RX ORDER — SEMAGLUTIDE 2.68 MG/ML
0.5 INJECTION, SOLUTION SUBCUTANEOUS
Qty: 9 ML | Refills: 2 | Status: SHIPPED | OUTPATIENT
Start: 2025-07-10 | End: 2025-07-16 | Stop reason: SDUPTHER

## 2025-07-10 NOTE — PROGRESS NOTES
cc:  diabetes    Subjective:     Quirino Barker is a 64 y.o. male presenting for diabetes        History of Present Illness  The patient presents to the office today with type 2 diabetes, hypertension, and hyperlipidemia to review labs.    He reports that his blood sugar levels have been increasing despite taking Ozempic, with a reading of 274 this morning. He expresses concern about the effectiveness of Ozempic and is hesitant to start insulin therapy. He recalls experiencing stomach upset and vomiting when his Ozempic dosage was increased from 0.5 mg to 1 mg, and he also noticed bruising at the injection site about 75% of the time. He changes the injection site every week. He has two more weeks' supply of Ozempic 1 mg left. He is considering resuming metformin but is concerned about potential kidney and liver damage. He consumes fatty foods due to time constraints during his lunch break. He has made some dietary changes, such as reducing his intake of French fries and limiting himself to one burger per meal. He typically eats out 5 to 7 days a week, at least once a day, but tries to make healthy choices. He is currently on Ozempic 1 mg, Actos 15 mg, and glipizide.    He does not monitor his blood pressure at home and takes his medication in the early morning, approximately an hour before his appointment. He believes his blood pressure has been consistently high, causing him to feel tense and experience pressure behind his eyes, although he does not report any chest discomfort. He also reports feeling tired and experiencing a burning sensation in his eyes in the afternoons. He has a history of seasonal allergies but has never taken medication for them. His sleep is often disrupted by his dogs. He is currently on lisinopril 5 mg.    He reports no muscle pain or issues with simvastatin but does experience constant muscle pain.    He is on aspirin and inquires if he should discontinue it due to bruising at the injection  "site.    He had laser surgery on his eyes.    PAST SURGICAL HISTORY:  Laser surgery on eyes.    FAMILY HISTORY  - No family history of heart attack or stroke       Review of systems:  See above.   Denies any symptoms unless previously indicated.      Current Medications[1]    Allergies, past medical history, past surgical history, family history, social history reviewed and updated    Objective:     Vitals: BP (!) 142/64   Pulse 86   Temp 36.6 °C (97.8 °F)   Resp 18   Ht 1.803 m (5' 11\")   Wt 105 kg (231 lb 4.2 oz)   SpO2 95%   BMI 32.25 kg/m²   General: Alert, pleasant, NAD  EYES:   PERRL, EOMI, no icterus or pallor.  Conjunctivae and lids normal.   HENT:  Normocephalic.  External ears normal.  Neck supple.   No thyromegaly or masses palpated. No cervical or supraclavicular lymphadenopathy.  Respiratory: Normal respiratory effort.   Abdomen:  obese  Skin: Warm, dry, no rashes.  Musculoskeletal: Gait is normal.  Moves all extremities well.    Neurological: No tremors, sensation grossly intact, , CN2-12 intact.  Psych:  Affect/mood is normal, judgement is good, memory is intact, grooming is appropriate.    Physical Exam  Cardiovascular: Blood pressure is running high.  Skin: Bruising at injection sites, possibly due to aspirin use.       Results  Labs   - A1c: Increased from 7.1 to 9.4   - Triglycerides: Elevated at 270 mg/dL   - LDL: 66 mg/dL   - Fasting glucose: 233 mg/dL   - PSA level: 11/2024, 0.99 ng/mL      Latest Reference Range & Units 05/29/25 08:07   Sodium 135 - 145 mmol/L 135   Potassium 3.6 - 5.5 mmol/L 4.9   Chloride 96 - 112 mmol/L 101   Co2 20 - 33 mmol/L 21   Anion Gap 7.0 - 16.0  13.0   Glucose 65 - 99 mg/dL 223 (H)   Bun 8 - 22 mg/dL 17   Creatinine 0.50 - 1.40 mg/dL 0.90   GFR (CKD-EPI) >60 mL/min/1.73 m 2 96   Calcium 8.5 - 10.5 mg/dL 9.7   Correct Calcium 8.5 - 10.5 mg/dL 9.4   AST(SGOT) 12 - 45 U/L 20   ALT(SGPT) 2 - 50 U/L 28   Alkaline Phosphatase 30 - 99 U/L 96   Total Bilirubin 0.1 - " 1.5 mg/dL 0.3   Albumin 3.2 - 4.9 g/dL 4.4   Total Protein 6.0 - 8.2 g/dL 7.2   Globulin 1.9 - 3.5 g/dL 2.8   A-G Ratio g/dL 1.6   Glycohemoglobin 4.0 - 5.6 % 9.4 (H)   Estim. Avg Glu mg/dL 223   Cholesterol,Tot 100 - 199 mg/dL 158   Triglycerides 0 - 149 mg/dL 270 (H)   HDL >=40 mg/dL 38 !   LDL <100 mg/dL 66   HIV Ag/Ab Combo Assay Non Reactive  Non-Reactive   (H): Data is abnormally high  !: Data is abnormal    Assessment/Plan:     Quirino was seen today for lab results.    Diagnoses and all orders for this visit:    Type 2 diabetes mellitus with other specified complication (HCC)  -     Microalbumin Creat Ratio Urine (Clinic Collect); Future  -     Semaglutide, 2 MG/DOSE, (OZEMPIC, 2 MG/DOSE,) 8 MG/3ML Solution Pen-injector; Inject 0.5 mg under the skin every 7 days.  -     pioglitazone (ACTOS) 30 MG Tab; Take 1 Tablet by mouth every day.  -     Lipid Profile; Future  -     Comp Metabolic Panel; Future  -     HEMOGLOBIN A1C; Future    Essential hypertension  -     lisinopril (PRINIVIL) 10 MG Tab; Take 1 Tablet by mouth every day.  -     Comp Metabolic Panel; Future    Hyperlipidemia associated with type 2 diabetes mellitus (HCC)  -     rosuvastatin (CRESTOR) 10 MG Tab; Take 1 Tablet by mouth every evening.  -     Lipid Profile; Future  -     Comp Metabolic Panel; Future    Obesity (BMI 30-39.9)  -     Patient identified as having weight management issue.  Appropriate orders and counseling given.        Assessment & Plan  1. Type 2 diabetes.  - A1c has increased from 7.1 to 9.4, and fasting glucose is 233. Blood sugar level this morning was 274.  - Currently on Ozempic 1 mg, Actos 15 mg, and glipizide.  - Dosage of Ozempic will be increased from 1 mg to 2 mg, and Actos will be increased from 15 mg to 30 mg. Advised to continue taking the 1 mg dose of Ozempic for the next two weeks until the new prescription is available.  - If blood sugar levels remain high, alternative medications such as Wegovy or Mounjaro may be  considered, pending insurance approval.    2. Hypertension.  - Blood pressure is running slightly high. Does not regularly check blood pressure at home.  - Dosage of lisinopril will be increased from 5 mg to 10 mg.  - Advised to monitor blood pressure at home.    3. Hyperlipidemia/obesity.  - Triglycerides are elevated at 270, but LDL is at 66.  - Currently on simvastatin.  - Simvastatin will be switched to rosuvastatin 10 mg to potentially reduce muscle pain and better manage cholesterol and triglycerides.  -discussion on improvements in diet and exercise as he is eating out daily.      4. Aspirin therapy.  - Advised to continue taking aspirin due to its health benefits, including reducing the risk of heart attack and stroke.    Follow-up  - Follow-up in 3 months.    Return in about 3 months (around 10/10/2025), or if symptoms worsen or fail to improve, for 40 min please.    Please note that this dictation was created using voice recognition software. I have made every reasonable attempt to correct obvious errors, but expect that there are errors of grammar and possible content that I did not discover before finalizing note.               [1]   Current Outpatient Medications:     Semaglutide, 2 MG/DOSE, (OZEMPIC, 2 MG/DOSE,) 8 MG/3ML Solution Pen-injector, Inject 0.5 mg under the skin every 7 days., Disp: 9 mL, Rfl: 2    lisinopril (PRINIVIL) 10 MG Tab, Take 1 Tablet by mouth every day., Disp: 100 Tablet, Rfl: 3    pioglitazone (ACTOS) 30 MG Tab, Take 1 Tablet by mouth every day., Disp: 100 Tablet, Rfl: 3    rosuvastatin (CRESTOR) 10 MG Tab, Take 1 Tablet by mouth every evening., Disp: 100 Tablet, Rfl: 3    glipiZIDE SR (GLUCOTROL) 10 MG TABLET SR 24 HR, Take 1 Tablet by mouth every day., Disp: 90 Tablet, Rfl: 2    triamcinolone acetonide (KENALOG) 0.1 % Cream, Apply to affected are twice a day as needed no more than 10 days., Disp: 30 g, Rfl: 0    Omega 3 340 MG CAPSULE DELAYED RELEASE, Take 500 mg by mouth every  day., Disp: , Rfl:     Blood Glucose Meter Kit, Test blood sugar as recommended by provider. One Touch Verio Flex or insurance preference blood glucose monitoring kit., Disp: 1 Kit, Rfl: 0    Blood Glucose Test Strips, Use one One Touch Verio Flex or insurance preference strip to test blood sugar twice daily., Disp: 200 Strip, Rfl: 3    Lancets, Use one One Touch Verio Flex lancet or insurance preference to test blood sugar twice daily., Disp: 200 Each, Rfl: 3    Alcohol Swabs, Wipe site with prep pad prior to injection., Disp: 200 Each, Rfl: 3    aspirin 81 MG tablet, Take 81 mg by mouth every day., Disp: , Rfl:     Multiple Vitamins-Minerals (CENTRUM SILVER ADULT 50+) Tab, Take  by mouth., Disp: , Rfl:     cholecalciferol (VITAMIN D3) 400 UNIT Tab, Take 400 Units by mouth every day. (Patient not taking: Reported on 7/10/2025), Disp: , Rfl:

## 2025-07-11 LAB
CREAT UR-MCNC: 117 MG/DL
MICROALBUMIN UR-MCNC: 1.8 MG/DL
MICROALBUMIN/CREAT UR: 15 MG/G (ref 0–30)

## 2025-07-16 DIAGNOSIS — E11.69 TYPE 2 DIABETES MELLITUS WITH OTHER SPECIFIED COMPLICATION (HCC): ICD-10-CM

## 2025-07-16 RX ORDER — SEMAGLUTIDE 2.68 MG/ML
2 INJECTION, SOLUTION SUBCUTANEOUS
Qty: 9 ML | Refills: 2 | Status: SHIPPED | OUTPATIENT
Start: 2025-07-16

## 2025-07-18 ENCOUNTER — TELEPHONE (OUTPATIENT)
Dept: MEDICAL GROUP | Facility: CLINIC | Age: 65
End: 2025-07-18
Payer: COMMERCIAL